# Patient Record
Sex: FEMALE | Race: WHITE | Employment: OTHER | URBAN - METROPOLITAN AREA
[De-identification: names, ages, dates, MRNs, and addresses within clinical notes are randomized per-mention and may not be internally consistent; named-entity substitution may affect disease eponyms.]

---

## 2017-01-01 ENCOUNTER — TELEPHONE (OUTPATIENT)
Dept: FAMILY MEDICINE | Facility: CLINIC | Age: 82
End: 2017-01-01

## 2017-01-01 ENCOUNTER — MEDICAL CORRESPONDENCE (OUTPATIENT)
Dept: HEALTH INFORMATION MANAGEMENT | Facility: CLINIC | Age: 82
End: 2017-01-01

## 2017-01-01 ENCOUNTER — TRANSFERRED RECORDS (OUTPATIENT)
Dept: HEALTH INFORMATION MANAGEMENT | Facility: CLINIC | Age: 82
End: 2017-01-01

## 2017-01-01 ENCOUNTER — ALLIED HEALTH/NURSE VISIT (OUTPATIENT)
Dept: FAMILY MEDICINE | Facility: CLINIC | Age: 82
End: 2017-01-01
Payer: MEDICARE

## 2017-01-01 ENCOUNTER — ALLIED HEALTH/NURSE VISIT (OUTPATIENT)
Dept: FAMILY MEDICINE | Facility: OTHER | Age: 82
End: 2017-01-01
Payer: MEDICARE

## 2017-01-01 ENCOUNTER — OFFICE VISIT (OUTPATIENT)
Dept: FAMILY MEDICINE | Facility: CLINIC | Age: 82
End: 2017-01-01
Payer: MEDICARE

## 2017-01-01 VITALS
BODY MASS INDEX: 21.48 KG/M2 | RESPIRATION RATE: 12 BRPM | HEART RATE: 92 BPM | WEIGHT: 113.7 LBS | SYSTOLIC BLOOD PRESSURE: 118 MMHG | DIASTOLIC BLOOD PRESSURE: 60 MMHG | TEMPERATURE: 99.2 F

## 2017-01-01 VITALS
WEIGHT: 115 LBS | BODY MASS INDEX: 21.73 KG/M2 | DIASTOLIC BLOOD PRESSURE: 70 MMHG | TEMPERATURE: 99 F | SYSTOLIC BLOOD PRESSURE: 150 MMHG | OXYGEN SATURATION: 97 % | HEART RATE: 83 BPM

## 2017-01-01 DIAGNOSIS — T83.511D URINARY TRACT INFECTION ASSOCIATED WITH INDWELLING URETHRAL CATHETER, SUBSEQUENT ENCOUNTER: Primary | ICD-10-CM

## 2017-01-01 DIAGNOSIS — E11.49 OTHER DIABETIC NEUROLOGICAL COMPLICATION ASSOCIATED WITH TYPE 2 DIABETES MELLITUS (H): ICD-10-CM

## 2017-01-01 DIAGNOSIS — N31.9 NEUROGENIC BLADDER: ICD-10-CM

## 2017-01-01 DIAGNOSIS — N39.0 URINARY TRACT INFECTION ASSOCIATED WITH INDWELLING URETHRAL CATHETER, INITIAL ENCOUNTER (H): Primary | ICD-10-CM

## 2017-01-01 DIAGNOSIS — E78.5 HYPERLIPIDEMIA LDL GOAL <100: ICD-10-CM

## 2017-01-01 DIAGNOSIS — I10 ESSENTIAL HYPERTENSION: ICD-10-CM

## 2017-01-01 DIAGNOSIS — J01.00 ACUTE NON-RECURRENT MAXILLARY SINUSITIS: ICD-10-CM

## 2017-01-01 DIAGNOSIS — E11.49 TYPE 2 DIABETES MELLITUS WITH OTHER NEUROLOGIC COMPLICATION, WITHOUT LONG-TERM CURRENT USE OF INSULIN (H): ICD-10-CM

## 2017-01-01 DIAGNOSIS — Z23 NEED FOR PROPHYLACTIC VACCINATION AND INOCULATION AGAINST INFLUENZA: Primary | ICD-10-CM

## 2017-01-01 DIAGNOSIS — S42.412D CLOSED DISPLACED SIMPLE SUPRACONDYLAR FRACTURE OF LEFT HUMERUS WITHOUT INTERCONDYLAR FRACTURE WITH ROUTINE HEALING, SUBSEQUENT ENCOUNTER: Primary | ICD-10-CM

## 2017-01-01 DIAGNOSIS — N39.0 URINARY TRACT INFECTION ASSOCIATED WITH INDWELLING URETHRAL CATHETER, SUBSEQUENT ENCOUNTER: Primary | ICD-10-CM

## 2017-01-01 DIAGNOSIS — R33.9 URINARY RETENTION WITH INCOMPLETE BLADDER EMPTYING: ICD-10-CM

## 2017-01-01 DIAGNOSIS — B35.1 ONYCHOMYCOSIS: ICD-10-CM

## 2017-01-01 DIAGNOSIS — T83.511A URINARY TRACT INFECTION ASSOCIATED WITH INDWELLING URETHRAL CATHETER, INITIAL ENCOUNTER (H): Primary | ICD-10-CM

## 2017-01-01 LAB
CREAT UR-MCNC: 97 MG/DL
EJECTION FRACTION: 65
HBA1C MFR BLD: 6.7 % (ref 4.3–6)
MICROALBUMIN UR-MCNC: 88 MG/L
MICROALBUMIN/CREAT UR: 90.39 MG/G CR (ref 0–25)

## 2017-01-01 PROCEDURE — 99214 OFFICE O/P EST MOD 30 MIN: CPT | Performed by: FAMILY MEDICINE

## 2017-01-01 PROCEDURE — 83036 HEMOGLOBIN GLYCOSYLATED A1C: CPT | Performed by: FAMILY MEDICINE

## 2017-01-01 PROCEDURE — 51702 INSERT TEMP BLADDER CATH: CPT

## 2017-01-01 PROCEDURE — 99211 OFF/OP EST MAY X REQ PHY/QHP: CPT | Mod: 25

## 2017-01-01 PROCEDURE — 36415 COLL VENOUS BLD VENIPUNCTURE: CPT | Performed by: FAMILY MEDICINE

## 2017-01-01 PROCEDURE — G0008 ADMIN INFLUENZA VIRUS VAC: HCPCS

## 2017-01-01 PROCEDURE — 99207 ZZC NO CHARGE NURSE ONLY: CPT

## 2017-01-01 PROCEDURE — 90662 IIV NO PRSV INCREASED AG IM: CPT

## 2017-01-01 PROCEDURE — 82043 UR ALBUMIN QUANTITATIVE: CPT | Performed by: FAMILY MEDICINE

## 2017-01-01 RX ORDER — CIPROFLOXACIN 500 MG/1
500 TABLET, FILM COATED ORAL 2 TIMES DAILY
Qty: 20 TABLET | Refills: 0 | Status: SHIPPED | OUTPATIENT
Start: 2017-01-01 | End: 2018-01-01

## 2017-01-01 RX ORDER — CIPROFLOXACIN 250 MG/1
TABLET, FILM COATED ORAL
COMMUNITY

## 2017-01-01 RX ORDER — ACETAMINOPHEN 325 MG/1
TABLET ORAL
COMMUNITY

## 2017-01-01 RX ORDER — QUETIAPINE FUMARATE 25 MG/1
TABLET, FILM COATED ORAL
COMMUNITY
Start: 2017-01-01

## 2017-01-01 RX ORDER — NITROFURANTOIN 25; 75 MG/1; MG/1
100 CAPSULE ORAL 2 TIMES DAILY
Qty: 20 CAPSULE | Refills: 0 | Status: SHIPPED | OUTPATIENT
Start: 2017-01-01 | End: 2017-01-01

## 2017-01-01 RX ORDER — QUETIAPINE FUMARATE 25 MG/1
TABLET, FILM COATED ORAL
COMMUNITY
End: 2017-01-01

## 2017-01-01 RX ORDER — OXYCODONE HYDROCHLORIDE 5 MG/1
TABLET ORAL
Refills: 0 | COMMUNITY
Start: 2017-06-29 | End: 2017-01-01

## 2017-01-01 ASSESSMENT — PAIN SCALES - GENERAL
PAINLEVEL: MODERATE PAIN (5)
PAINLEVEL: MILD PAIN (3)

## 2017-01-11 ENCOUNTER — ALLIED HEALTH/NURSE VISIT (OUTPATIENT)
Dept: FAMILY MEDICINE | Facility: CLINIC | Age: 82
End: 2017-01-11
Payer: MEDICARE

## 2017-01-11 DIAGNOSIS — N39.0 URINARY TRACT INFECTION ASSOCIATED WITH INDWELLING URETHRAL CATHETER, INITIAL ENCOUNTER (H): ICD-10-CM

## 2017-01-11 DIAGNOSIS — R41.82 ALTERED MENTAL STATUS, UNSPECIFIED ALTERED MENTAL STATUS TYPE: ICD-10-CM

## 2017-01-11 DIAGNOSIS — Z46.6 URINARY CATHETER (FOLEY) CHANGE REQUIRED: Primary | ICD-10-CM

## 2017-01-11 DIAGNOSIS — T83.511A URINARY TRACT INFECTION ASSOCIATED WITH INDWELLING URETHRAL CATHETER, INITIAL ENCOUNTER (H): ICD-10-CM

## 2017-01-11 LAB
ALBUMIN UR-MCNC: 100 MG/DL
APPEARANCE UR: ABNORMAL
BACTERIA #/AREA URNS HPF: ABNORMAL /HPF
BILIRUB UR QL STRIP: NEGATIVE
COLOR UR AUTO: YELLOW
GLUCOSE UR STRIP-MCNC: NEGATIVE MG/DL
HGB UR QL STRIP: ABNORMAL
KETONES UR STRIP-MCNC: NEGATIVE MG/DL
LEUKOCYTE ESTERASE UR QL STRIP: ABNORMAL
NITRATE UR QL: NEGATIVE
NON-SQ EPI CELLS #/AREA URNS LPF: ABNORMAL /LPF
PH UR STRIP: 7.5 PH (ref 5–7)
RBC #/AREA URNS AUTO: ABNORMAL /HPF (ref 0–2)
SP GR UR STRIP: 1.02 (ref 1–1.03)
URN SPEC COLLECT METH UR: ABNORMAL
UROBILINOGEN UR STRIP-ACNC: 0.2 EU/DL (ref 0.2–1)
WBC #/AREA URNS AUTO: ABNORMAL /HPF (ref 0–2)

## 2017-01-11 PROCEDURE — 81001 URINALYSIS AUTO W/SCOPE: CPT | Performed by: PHYSICIAN ASSISTANT

## 2017-01-11 PROCEDURE — 87186 SC STD MICRODIL/AGAR DIL: CPT | Performed by: PHYSICIAN ASSISTANT

## 2017-01-11 PROCEDURE — 99207 ZZC NO CHARGE NURSE ONLY: CPT

## 2017-01-11 PROCEDURE — 87088 URINE BACTERIA CULTURE: CPT | Performed by: PHYSICIAN ASSISTANT

## 2017-01-11 PROCEDURE — 87086 URINE CULTURE/COLONY COUNT: CPT | Performed by: PHYSICIAN ASSISTANT

## 2017-01-11 RX ORDER — CIPROFLOXACIN 500 MG/1
500 TABLET, FILM COATED ORAL 2 TIMES DAILY
Qty: 20 TABLET | Refills: 0 | Status: SHIPPED | OUTPATIENT
Start: 2017-01-11 | End: 2017-02-01

## 2017-01-11 NOTE — PROGRESS NOTES
Catheter insertion documentation on 1/11/2017:    María Hill presents to the clinic for catheter insertion.  Reason for insertion: scheduled insertion  Order has been verified. Dr. Adams  Catheter successfully inserted into the urethral meatus in the usual sterile fashion without immediate complication.  Type of catheter placed: 16 Australian indwelling catheter  Urine is cloudy and light yellow in color.  30 cc's of urine output returned.  Balloon was filled with 10 cc's of normal saline.  Securement device placed for the catheter.  The patient tolerated the procedure and was instructed to follow up with their PCP or consultant as planned, monitor for catheter dysfunction, monitor for pain or discomfort, return or call for pain, fever, leakage or decreased urine flow, watch for signs of infection and will call with UA results.    Alyce Kunz

## 2017-01-11 NOTE — PROGRESS NOTES
Left message on machine per ok of daughter Keith. Notified UA showed UTI, cipro sent to [facility] in [place]. Will need clinic appt with Dr. Adams for f/u once antibiotic complete.    Alyce Kunz, RN, BSN

## 2017-01-11 NOTE — PROGRESS NOTES
REviewed pts labs- last UA and UC. Last UC 12/7/2016- culture positive for e.coli, klebsiella, and pseudomonas, all susceptible to cipro.   Pt with normal sr creatinine and gfr 12/2016.  Will send cipro 500mg bid x10d to her pharmacy, urine to culture.  Follow up for OV if sxs not improving or worsening.   Kimmie Davies PA-C

## 2017-01-11 NOTE — PROGRESS NOTES
Catheter removal documentation on 1/11/2017:    María Hill presents to the clinic for catheter removal.  Reason for removal: replacement and scheduled insertion  Order has been verified. Orders per Dr. Adams  Catheter successfully removed at 9:29 AM without immediate complication.  500 cc's of urine present in the catheter bag.  Urethral meatus is free of secretions and encrustation.  The patient is afebrile.  The patient tolerated the procedure and was instructed to follow up with their PCP or consultant as planned, monitor for catheter dysfunction, monitor for pain or discomfort, return or call for pain, fever, leakage or decreased urine flow, watch for signs of infection and UA ordered, will call with results.     Alyce Kunz

## 2017-01-18 DIAGNOSIS — T83.511D URINARY TRACT INFECTION ASSOCIATED WITH INDWELLING URETHRAL CATHETER, SUBSEQUENT ENCOUNTER: Primary | ICD-10-CM

## 2017-01-18 DIAGNOSIS — N39.0 URINARY TRACT INFECTION ASSOCIATED WITH INDWELLING URETHRAL CATHETER, SUBSEQUENT ENCOUNTER: Primary | ICD-10-CM

## 2017-01-18 LAB
BACTERIA SPEC CULT: ABNORMAL
MICRO REPORT STATUS: ABNORMAL
MICROORGANISM SPEC CULT: ABNORMAL
MICROORGANISM SPEC CULT: ABNORMAL
SPECIMEN SOURCE: ABNORMAL

## 2017-01-18 RX ORDER — TETRACYCLINE HYDROCHLORIDE 500 MG/1
500 CAPSULE ORAL 2 TIMES DAILY
Qty: 20 CAPSULE | Refills: 0 | Status: SHIPPED | OUTPATIENT
Start: 2017-01-18 | End: 2017-02-01

## 2017-01-18 NOTE — PROGRESS NOTES
SUBJECTIVE:                                                    María Hill is a 89 year old female who presents to clinic today for the following health issues:      URINARY TRACT SYMPTOMS     Onset: 1month     Description:   Painful urination (Dysuria): no   Blood in urine (Hematuria): no   Delay in urine (Hesitency): no     Intensity: mild    Progression of Symptoms:  same    Accompanying Signs & Symptoms:  Fever/chills: yes  Flank pain no   Nausea and vomiting: no   Any vaginal symptoms: none  Abdominal/Pelvic Pain: no    History:   History of frequent UTI's: YES  History of kidney stones: no   Sexually Active: no   Possibility of pregnancy: No    Precipitating factors:   none         Therapies Tried and outcome: 2 rounds of anbtibiotics      In December, urine culture showed 3 different bacteria. Treated with cipro and the bacteria were all susceptible.   On recheck in January, she again had growth on culture. This time 2 different bacteria.   She has urine cath chronically  She has fatigue but no other symptoms.   No fever or vomiting. Appetite is unchanged.     Problem list and histories reviewed & adjusted, as indicated.  Additional history: as documented    Problem list, Medication list, Allergies, and Medical/Social/Surgical histories reviewed in [facility] and updated as appropriate.    ROS:  C: NEGATIVE for fever, chills, change in weight  E/M: NEGATIVE for ear, mouth and throat problems  R: NEGATIVE for significant cough or SOB  CV: NEGATIVE for chest pain, palpitations or peripheral edema  GI: NEGATIVE for nausea, abdominal pain, heartburn, or change in bowel habits   female: as above.     OBJECTIVE:                                                    /70 mmHg  Pulse 74  Temp(Src) 99.3  F (37.4  C) (Temporal)  Resp 16  Ht   Wt 114 lb 3.2 oz (51.801 kg)  Body mass index is 21.59 kg/(m^2).  GENERAL APPEARANCE: healthy, alert and no distress  HENT: throat is clear, Mucous membranes are  moist.   NECK: no adenopathy, no asymmetry, masses, or scars and thyroid normal to palpation  RESP: lungs clear to auscultation - no rales, rhonchi or wheezes  CV: regular rates and rhythm, normal S1 S2, no S3 or S4 and no murmur, click or rub  ABDOMEN: soft, nontender, without hepatosplenomegaly or masses and bowel sounds normal  MS: trace to 1+ edema bilateral lower extremities.     Diagnostic Test Results:  Results for orders placed or performed in visit on 02/01/17 (from the past 24 hour(s))   UA reflex to Microscopic and Culture   Result Value Ref Range    Color Urine Yellow     Appearance Urine Clear     Glucose Urine Negative NEG mg/dL    Bilirubin Urine Negative NEG    Ketones Urine Negative NEG mg/dL    Specific Gravity Urine 1.020 1.003 - 1.035    Blood Urine Moderate (A) NEG    pH Urine 7.0 5.0 - 7.0 pH    Protein Albumin Urine 100 (A) NEG mg/dL    Urobilinogen Urine 0.2 0.2 - 1.0 EU/dL    Nitrite Urine Negative NEG    Leukocyte Esterase Urine Large (A) NEG    Source Catheterized Urine    Urine Microscopic   Result Value Ref Range    WBC Urine  (A) 0 - 2 /HPF    RBC Urine 25-50 (A) 0 - 2 /HPF    Renal Tub Epi Moderate (A) NEG /HPF    Bacteria Urine Moderate (A) NEG /HPF    Yeast Urine Moderate (A) NEG /HPF        ASSESSMENT/PLAN:                                                        (T83.511D,  N39.0) Urinary tract infection associated with indwelling urethral catheter, subsequent encounter  (primary encounter diagnosis)  (N31.9) Neurogenic bladder  Comment: patient with chronic catheter due to neurogenic bladder.   Reviewed results with her.   Will place back on cipro.   Awaiting culture results.   Has has different bacteria with the two cultures obtained December and January.   Will adjust medication as needed.   Plan: ciprofloxacin (CIPRO) 500 MG tablet        Patient and her daughter agree to plan.       (R60.0) Bilateral edema of lower extremity  Comment: mild.   Plan: discussed elevation of  legs. Use SIDDHARTH hose.   Recheck if fails to improve or if worsening in any way.     (R30.0) Dysuria  Comment: see above.   Plan: UA reflex to Microscopic and Culture, Urine         Microscopic            (R82.90) Nonspecific finding on examination of urine  Comment: as above.   Plan: Urine Culture Aerobic Bacterial              MAURICE WOODS MD, MD  [facility]

## 2017-01-18 NOTE — TELEPHONE ENCOUNTER
Miladis called back. [facility]/ [facility] [place].   Thank you,  Rocio Carmona- Pt Rep.

## 2017-01-18 NOTE — TELEPHONE ENCOUNTER
Left message to call back. CTC on file with daughter Miladis. Will still need f/u on 1/24/17 as planned. Need to triage and determine if symptoms are worsening/improving/same.     Alyce Kunz, RN, BSN

## 2017-01-18 NOTE — TELEPHONE ENCOUNTER
Please call pt- was in for catheter change last week, concerns for UTI. Urine culture showed mixed bacteria. Cultured showing staph bacteria not sensitive to the cipro has been taking. Culture sensitive to tetracycline-- can send in 500mg BID x 10 d. Which pharmacy would she like this to go to. Is she still having sxs? Worsening or stable?  Kimmie Davies PA-C

## 2017-02-01 ENCOUNTER — OFFICE VISIT (OUTPATIENT)
Dept: FAMILY MEDICINE | Facility: CLINIC | Age: 82
End: 2017-02-01
Payer: MEDICARE

## 2017-02-01 VITALS
WEIGHT: 114.2 LBS | SYSTOLIC BLOOD PRESSURE: 120 MMHG | DIASTOLIC BLOOD PRESSURE: 70 MMHG | RESPIRATION RATE: 16 BRPM | HEART RATE: 74 BPM | BODY MASS INDEX: 21.59 KG/M2 | TEMPERATURE: 99.3 F

## 2017-02-01 DIAGNOSIS — R82.90 NONSPECIFIC FINDING ON EXAMINATION OF URINE: ICD-10-CM

## 2017-02-01 DIAGNOSIS — T83.511D URINARY TRACT INFECTION ASSOCIATED WITH INDWELLING URETHRAL CATHETER, SUBSEQUENT ENCOUNTER: Primary | ICD-10-CM

## 2017-02-01 DIAGNOSIS — R60.0 BILATERAL EDEMA OF LOWER EXTREMITY: ICD-10-CM

## 2017-02-01 DIAGNOSIS — R30.0 DYSURIA: ICD-10-CM

## 2017-02-01 DIAGNOSIS — N39.0 URINARY TRACT INFECTION ASSOCIATED WITH INDWELLING URETHRAL CATHETER, SUBSEQUENT ENCOUNTER: Primary | ICD-10-CM

## 2017-02-01 DIAGNOSIS — N31.9 NEUROGENIC BLADDER: ICD-10-CM

## 2017-02-01 LAB
ALBUMIN UR-MCNC: 100 MG/DL
APPEARANCE UR: CLEAR
BACTERIA #/AREA URNS HPF: ABNORMAL /HPF
BILIRUB UR QL STRIP: NEGATIVE
COLOR UR AUTO: YELLOW
GLUCOSE UR STRIP-MCNC: NEGATIVE MG/DL
HGB UR QL STRIP: ABNORMAL
KETONES UR STRIP-MCNC: NEGATIVE MG/DL
LEUKOCYTE ESTERASE UR QL STRIP: ABNORMAL
NITRATE UR QL: NEGATIVE
PH UR STRIP: 7 PH (ref 5–7)
RBC #/AREA URNS AUTO: ABNORMAL /HPF (ref 0–2)
RENAL EPI CELLS #/AREA URNS HPF: ABNORMAL /HPF
SP GR UR STRIP: 1.02 (ref 1–1.03)
URN SPEC COLLECT METH UR: ABNORMAL
UROBILINOGEN UR STRIP-ACNC: 0.2 EU/DL (ref 0.2–1)
WBC #/AREA URNS AUTO: ABNORMAL /HPF (ref 0–2)
YEAST #/AREA URNS HPF: ABNORMAL /HPF

## 2017-02-01 PROCEDURE — 87106 FUNGI IDENTIFICATION YEAST: CPT | Performed by: FAMILY MEDICINE

## 2017-02-01 PROCEDURE — 87086 URINE CULTURE/COLONY COUNT: CPT | Performed by: FAMILY MEDICINE

## 2017-02-01 PROCEDURE — 81001 URINALYSIS AUTO W/SCOPE: CPT | Performed by: FAMILY MEDICINE

## 2017-02-01 PROCEDURE — 99214 OFFICE O/P EST MOD 30 MIN: CPT | Performed by: FAMILY MEDICINE

## 2017-02-01 RX ORDER — CIPROFLOXACIN 500 MG/1
500 TABLET, FILM COATED ORAL 2 TIMES DAILY
Qty: 20 TABLET | Refills: 0 | Status: SHIPPED | OUTPATIENT
Start: 2017-02-01 | End: 2017-04-19

## 2017-02-01 NOTE — MR AVS SNAPSHOT
After Visit Summary   2/1/2017    María Hill    MRN: [id number]           Patient Information     Date Of Birth          2/22/1926        Visit Information        Provider Department      2/1/2017 11:40 AM Jessy Adams MD [facility] [place]        Today's Diagnoses     Urinary tract infection associated with indwelling urethral catheter, subsequent encounter    -  1     Bilateral edema of lower extremity         Dysuria         Nonspecific finding on examination of urine           Care Instructions      [facility] [phone]          Follow-ups after your visit        Who to contact     If you have questions or need follow up information about today's clinic visit or your schedule please contact [facility] [place] directly at [phone].  Normal or non-critical lab and imaging results will be communicated to you by MyChart, letter or phone within 4 business days after the clinic has received the results. If you do not hear from us within 7 days, please contact the clinic through [email or web address] or phone. If you have a critical or abnormal lab result, we will notify you by phone as soon as possible.  Submit refill requests through [employer] or call your pharmacy and they will forward the refill request to us. Please allow 3 business days for your refill to be completed.          Additional Information About Your Visit        MyChart Information     [employer] gives you secure access to your electronic health record. If you see a primary care provider, you can also send messages to your care team and make appointments. If you have questions, please call your primary care clinic.  If you do not have a primary care provider, please call [phone] and they will assist you.        Care EveryWhere ID     This is your Care EveryWhere ID. This could be used by other organizations to access your [facility] medical records  [id number]        Your Vitals Were     Pulse Temperature  Respirations             74 99.3  F (37.4  C) (Temporal) 16          Blood Pressure from Last 3 Encounters:   02/01/17 120/70   12/07/16 122/80   09/22/16 134/80    Weight from Last 3 Encounters:   02/01/17 114 lb 3.2 oz (51.801 kg)   12/07/16 115 lb 3.2 oz (52.254 kg)   09/22/16 112 lb 11.2 oz (51.12 kg)              We Performed the Following     UA reflex to Microscopic and Culture     Urine Culture Aerobic Bacterial     Urine Microscopic          Today's Medication Changes          These changes are accurate as of: 2/1/17 12:28 PM.  If you have any questions, ask your nurse or doctor.               Start taking these medicines.        Dose/Directions    ciprofloxacin 500 MG tablet   Commonly known as:  CIPRO   Used for:  Urinary tract infection associated with indwelling urethral catheter, subsequent encounter   Started by:  Jessy Adams MD        Dose:  500 mg   Take 1 tablet (500 mg) by mouth 2 times daily   Quantity:  20 tablet   Refills:  0            Where to get your medicines      These medications were sent to [place] [address] [place] [address] [place] [id number]     Phone:  [phone]    - ciprofloxacin 500 MG tablet             Primary Care Provider Office Phone # Fax #    Jessy Adams [phone]       [facility] [address] [facility] [address]        Thank you!     Thank you for choosing [facility]  for your care. Our goal is always to provide you with excellent care. Hearing back from our patients is one way we can continue to improve our services. Please take a few minutes to complete the written survey that you may receive in the mail after your visit with us. Thank you!             Your Updated Medication List - Protect others around you: Learn how to safely use, store and throw away your medicines at www.disposemymeds.org.          This list is accurate as of: 2/1/17 12:28 PM.  Always use your most  recent med list.                   Brand Name Dispense Instructions for use    aspirin 81 MG tablet      Take 81 mg by mouth daily       blood glucose monitoring lancets     100 Box    1 each by In Vitro route 3 times daily Use to test blood sugar 3 times daily or as directed.       blood glucose monitoring meter device kit     1 kit    USE TO CHECK GLUCOSE THREE TIMES DAILY OR AS DIRECTED       blood glucose monitoring test strip    ONE TOUCH ULTRA    100 each    TEST 3 TIMES DAILY OR AS DIRECTED       ciprofloxacin 500 MG tablet    CIPRO    20 tablet    Take 1 tablet (500 mg) by mouth 2 times daily       COQ-10 PO          CRANBERRY CONCENTRATE 500 MG Caps   Generic drug:  Cranberry     60 capsule    TAKE 1 CAPSULE BY MOUTH DAILY       GLUCOSAMINE CHONDR COMPLEX PO          metFORMIN 500 MG 24 hr tablet    GLUCOPHAGE-XR    360 tablet    TAKE 2 TABLETS BY MOUTH 2 TIMES DAILY WITH MEALS       OCUVITE PRESERVISION Tabs          * order for DME     2 each    Equipment being ordered: catheter secure strap, please fill the elastic strap with the green velcroe closure.       * order for DME     1 Units    Equipment being ordered: Wheelchair       order for DME     3 each    Equipment being ordered: supplies for catheterization. Leg bag replacements and attachments. Dispense per insurance allowance.       ramipril 10 MG capsule    ALTACE    90 capsule    TAKE 1 CAPSULE BY MOUTH DAILY       T.E.D. BELOW KNEE/M-REGULAR Misc     1 Package    Wear stockings daily       * Notice:  This list has 2 medication(s) that are the same as other medications prescribed for you. Read the directions carefully, and ask your doctor or other care provider to review them with you.

## 2017-02-01 NOTE — NURSING NOTE
"Chief Complaint   Patient presents with     RECHECK     UTI     Panel Management     Statin, Eye exam       Initial /70 mmHg  Pulse 74  Temp(Src) 99.3  F (37.4  C) (Temporal)  Resp 16  Ht   Wt 114 lb 3.2 oz (51.801 kg) Estimated body mass index is 21.59 kg/(m^2) as calculated from the following:    Height as of 12/7/16: 5' 1\" (1.549 m).    Weight as of this encounter: 114 lb 3.2 oz (51.801 kg).  BP completed using cuff size: regular  SMA Rochelle    "

## 2017-02-03 LAB
BACTERIA SPEC CULT: ABNORMAL
MICRO REPORT STATUS: ABNORMAL
SPECIMEN SOURCE: ABNORMAL

## 2017-02-06 ENCOUNTER — TELEPHONE (OUTPATIENT)
Dept: FAMILY MEDICINE | Facility: CLINIC | Age: 82
End: 2017-02-06

## 2017-02-06 NOTE — TELEPHONE ENCOUNTER
Please notify patient's daughter Miladis of results.     Patient's culture grew yeast (candida).  This is likely from the antibiotics that she has been on.  It is unlikely that this needs to be treated as she doesn't have symptoms of kidney infection - no fever, back pain or abdominal pain.     If those things develop, then would like to see her in the office.     Please stop the Cipro - it is not needed.     The catheter being changed last week will likely help the yeast resolve.     If any concerns, please let me know.

## 2017-02-10 DIAGNOSIS — E11.65 TYPE 2 DIABETES MELLITUS WITH HYPERGLYCEMIA (H): Primary | ICD-10-CM

## 2017-02-10 NOTE — TELEPHONE ENCOUNTER
Metformin 500 MG         Last Written Prescription Date: 08/05/2016  Last Fill Quantity: 360, # refills: 1  Last Office Visit with [facility], [facility]MP or  Health prescribing provider:  12/1/2017   Next 5 appointments (look out 90 days)     Mar 01, 2017 10:00 AM   Nurse Only with RG RN   [facility] [place] ([facility]    [address], [address]  [place] [id number] [phone]                   BP Readings from Last 3 Encounters:   02/01/17 120/70   12/07/16 122/80   09/22/16 134/80     MICROL      111   6/9/2016  No results found for this basename: microalbumin  CREATININE   Date Value Ref Range Status   12/07/2016 0.65 0.52 - 1.04 mg/dL Final   ]  GFR ESTIMATE   Date Value Ref Range Status   12/07/2016 86 >60 mL/min/1.7m2 Final     Comment:     Non  GFR Calc   05/28/2016 >90  Non  GFR Calc   >60 mL/min/1.7m2 Final   05/24/2016 88 >60 mL/min/1.7m2 Final     Comment:     Non  GFR Calc     GFR ESTIMATE IF BLACK   Date Value Ref Range Status   12/07/2016 >90   GFR Calc   >60 mL/min/1.7m2 Final   05/28/2016 >90   GFR Calc   >60 mL/min/1.7m2 Final   05/24/2016 >90   GFR Calc   >60 mL/min/1.7m2 Final     CHOL      219   12/7/2016  HDL       64   12/7/2016  LDL      133   12/7/2016  TRIG      108   12/7/2016  CHOLHDLRATIO      2.6   5/6/2015  AST       13   12/7/2016  ALT       25   12/7/2016  A1C      7.0   12/7/2016  A1C      7.0   6/9/2016  A1C      7.8   12/9/2015  A1C      7.3   8/13/2015  A1C      7.3   5/6/2015  POTASSIUM   Date Value Ref Range Status   12/07/2016 3.8 3.4 - 5.3 mmol/L Final

## 2017-02-14 RX ORDER — METFORMIN HCL 500 MG
TABLET, EXTENDED RELEASE 24 HR ORAL
Qty: 360 TABLET | Refills: 0 | Status: SHIPPED | OUTPATIENT
Start: 2017-02-14 | End: 2017-05-11

## 2017-02-14 NOTE — TELEPHONE ENCOUNTER
Prescription approved per [facility] Refill Protocol.  Due 6/2017 for recheck of diabetes and other HM. Labs will be due also.  Alyce Kunz, RN, BSN

## 2017-03-01 ENCOUNTER — ALLIED HEALTH/NURSE VISIT (OUTPATIENT)
Dept: FAMILY MEDICINE | Facility: CLINIC | Age: 82
End: 2017-03-01
Payer: MEDICARE

## 2017-03-01 DIAGNOSIS — Z46.6 URINARY CATHETER (FOLEY) CHANGE REQUIRED: Primary | ICD-10-CM

## 2017-03-01 PROCEDURE — 99207 ZZC NO CHARGE NURSE ONLY: CPT

## 2017-03-01 NOTE — PROGRESS NOTES
Catheter insertion documentation on 3/1/2017:    María Hill presents to the clinic for catheter insertion.  Reason for insertion: scheduled insertion  Order has been verified. SF orders  Catheter successfully inserted into the urethral meatus in the usual sterile fashion without immediate complication.  Type of catheter placed: 16 Uzbek indwelling catheter  Urine is yellow in color.  20 cc's of urine output returned.  Balloon was filled with 10 cc's of normal saline.  Securement device placed for the catheter.  The patient tolerated the procedure and was instructed to follow up with their PCP or consultant as planned, monitor for catheter dysfunction, monitor for pain or discomfort, return or call for pain, fever, leakage or decreased urine flow and watch for signs of infection    Alyce Kunz

## 2017-03-01 NOTE — MR AVS SNAPSHOT
After Visit Summary   3/1/2017    María Hill    MRN: [id number]           Patient Information     Date Of Birth          2/22/1926        Visit Information        Provider Department      3/1/2017 10:00 AM [facility] [place]        Today's Diagnoses     Urinary catheter (Schultz) change required    -  1       Follow-ups after your visit        Follow-up notes from your care team     Return in about 1 month (around 4/1/2017).      Your next 10 appointments already scheduled     Apr 03, 2017 10:00 AM CDT   Nurse Only with RG RN   [facility] [place] ([facility] [place])    [address], [address]  [place] [id number] [phone]              Who to contact     If you have questions or need follow up information about today's clinic visit or your schedule please contact [facility] [place] directly at [phone].  Normal or non-critical lab and imaging results will be communicated to you by MyChart, letter or phone within 4 business days after the clinic has received the results. If you do not hear from us within 7 days, please contact the clinic through [employer] or phone. If you have a critical or abnormal lab result, we will notify you by phone as soon as possible.  Submit refill requests through [employer] or call your pharmacy and they will forward the refill request to us. Please allow 3 business days for your refill to be completed.          Additional Information About Your Visit        MyChart Information     [employer] gives you secure access to your electronic health record. If you see a primary care provider, you can also send messages to your care team and make appointments. If you have questions, please call your primary care clinic.  If you do not have a primary care provider, please call [phone] and they will assist you.        Care EveryWhere ID     This is your Care EveryWhere ID. This could be used by other organizations to access your  [facility] medical records  [id number]         Blood Pressure from Last 3 Encounters:   02/01/17 120/70   12/07/16 122/80   09/22/16 134/80    Weight from Last 3 Encounters:   02/01/17 114 lb 3.2 oz (51.8 kg)   12/07/16 115 lb 3.2 oz (52.3 kg)   09/22/16 112 lb 11.2 oz (51.1 kg)              Today, you had the following     No orders found for display       Primary Care Provider Office Phone # Fax #    Jessy Adams [phone]       [facility] [address] [facility] [address]        Thank you!     Thank you for choosing [facility]  for your care. Our goal is always to provide you with excellent care. Hearing back from our patients is one way we can continue to improve our services. Please take a few minutes to complete the written survey that you may receive in the mail after your visit with us. Thank you!             Your Updated Medication List - Protect others around you: Learn how to safely use, store and throw away your medicines at www.disposemymeds.org.          This list is accurate as of: 3/1/17 10:56 AM.  Always use your most recent med list.                   Brand Name Dispense Instructions for use    aspirin 81 MG tablet      Take 81 mg by mouth daily       blood glucose monitoring lancets     100 Box    1 each by In Vitro route 3 times daily Use to test blood sugar 3 times daily or as directed.       blood glucose monitoring meter device kit     1 kit    USE TO CHECK GLUCOSE THREE TIMES DAILY OR AS DIRECTED       blood glucose monitoring test strip    ONE TOUCH ULTRA    100 each    TEST 3 TIMES DAILY OR AS DIRECTED       ciprofloxacin 500 MG tablet    CIPRO    20 tablet    Take 1 tablet (500 mg) by mouth 2 times daily       COQ-10 PO          CRANBERRY CONCENTRATE 500 MG Caps   Generic drug:  Cranberry     60 capsule    TAKE 1 CAPSULE BY MOUTH DAILY       GLUCOSAMINE CHONDR COMPLEX PO          metFORMIN 500 MG 24 hr tablet    GLUCOPHAGE-XR    360 tablet     TAKE 2 TABLETS BY MOUTH 2 TIMES DAILY WITH MEALS       OCUVITE PRESERVISION Tabs          * order for DME     2 each    Equipment being ordered: catheter secure strap, please fill the elastic strap with the green velcroe closure.       * order for DME     1 Units    Equipment being ordered: Wheelchair       order for DME     3 each    Equipment being ordered: supplies for catheterization. Leg bag replacements and attachments. Dispense per insurance allowance.       ramipril 10 MG capsule    ALTACE    90 capsule    TAKE 1 CAPSULE BY MOUTH DAILY       T.E.D. BELOW KNEE/M-REGULAR Misc     1 Package    Wear stockings daily       * Notice:  This list has 2 medication(s) that are the same as other medications prescribed for you. Read the directions carefully, and ask your doctor or other care provider to review them with you.

## 2017-03-01 NOTE — PROGRESS NOTES
Catheter removal documentation on 3/1/2017:    María Hill presents to the clinic for catheter removal.  Reason for removal: replacement  Order has been verified. SF orders  Catheter successfully removed at 10:53 AM without immediate complication.  200 cc's of urine present in the catheter bag.  Urethral meatus is free of secretions and encrustation.  The patient is afebrile.  The patient tolerated the procedure and was instructed to follow up with their PCP or consultant as planned, monitor for catheter dysfunction, monitor for pain or discomfort, return or call for pain, fever, leakage or decreased urine flow, watch for signs of infection and schedule with RN in 1 month for replacement.    Alyce Kunz

## 2017-03-16 ENCOUNTER — TRANSFERRED RECORDS (OUTPATIENT)
Dept: HEALTH INFORMATION MANAGEMENT | Facility: CLINIC | Age: 82
End: 2017-03-16

## 2017-04-03 ENCOUNTER — ALLIED HEALTH/NURSE VISIT (OUTPATIENT)
Dept: FAMILY MEDICINE | Facility: CLINIC | Age: 82
End: 2017-04-03
Payer: MEDICARE

## 2017-04-03 ENCOUNTER — TELEPHONE (OUTPATIENT)
Dept: FAMILY MEDICINE | Facility: CLINIC | Age: 82
End: 2017-04-03

## 2017-04-03 DIAGNOSIS — Z46.6 URINARY CATHETER (FOLEY) CHANGE REQUIRED: Primary | ICD-10-CM

## 2017-04-03 LAB
ALBUMIN UR-MCNC: ABNORMAL MG/DL
AMORPH CRY #/AREA URNS HPF: ABNORMAL /HPF
APPEARANCE UR: ABNORMAL
BACTERIA #/AREA URNS HPF: ABNORMAL /HPF
BILIRUB UR QL STRIP: NEGATIVE
COLOR UR AUTO: YELLOW
GLUCOSE UR STRIP-MCNC: NEGATIVE MG/DL
HGB UR QL STRIP: ABNORMAL
KETONES UR STRIP-MCNC: NEGATIVE MG/DL
LEUKOCYTE ESTERASE UR QL STRIP: ABNORMAL
NITRATE UR QL: NEGATIVE
NON-SQ EPI CELLS #/AREA URNS LPF: ABNORMAL /LPF
PH UR STRIP: 5 PH (ref 5–7)
RBC #/AREA URNS AUTO: ABNORMAL /HPF (ref 0–2)
SP GR UR STRIP: 1.03 (ref 1–1.03)
URN SPEC COLLECT METH UR: ABNORMAL
UROBILINOGEN UR STRIP-ACNC: 0.2 EU/DL (ref 0.2–1)
WBC #/AREA URNS AUTO: ABNORMAL /HPF (ref 0–2)

## 2017-04-03 PROCEDURE — 81001 URINALYSIS AUTO W/SCOPE: CPT | Performed by: FAMILY MEDICINE

## 2017-04-03 PROCEDURE — 87186 SC STD MICRODIL/AGAR DIL: CPT | Performed by: FAMILY MEDICINE

## 2017-04-03 PROCEDURE — 87086 URINE CULTURE/COLONY COUNT: CPT | Performed by: FAMILY MEDICINE

## 2017-04-03 PROCEDURE — 87088 URINE BACTERIA CULTURE: CPT | Performed by: FAMILY MEDICINE

## 2017-04-03 PROCEDURE — 99207 ZZC NO CHARGE NURSE ONLY: CPT

## 2017-04-03 NOTE — TELEPHONE ENCOUNTER
Called Miladis - daughter. No answer. Left message.     UA shows some white blood cells. Reviewed results in Care Everywhere. Last urine culture appears to be contaminants.  Has chronic catheter. Changed today.     Recommend awaiting culture results. If having increase in confusion, fever, vomiting, increased weakness or other concerns, consider antibiotic until culture results are back.     To return call with any questions or concerns.

## 2017-04-03 NOTE — PROGRESS NOTES
UA placed per verbal order of Dr. Adams. Patient had been recently hospitalized for UTI. Has not had UA since stopping antibiotic therapy. Patient to f/u in clinic with provider.      Catheter removal documentation on 4/3/2017:    María Hill presents to the clinic for catheter removal.  Reason for removal: replacement  Order has been verified. Per Dr. Adams standing order.   Catheter successfully removed at 11:07 AM without immediate complication.  100 cc's of urine present in the catheter bag.  Urethral meatus is free of secretions and encrustation.  The patient is afebrile.  The patient tolerated the procedure and was instructed to follow up with their PCP or consultant as planned, monitor for catheter dysfunction, monitor for pain or discomfort, return or call for pain, fever, leakage or decreased urine flow and watch for signs of infection    Alyce Kunz        Catheter insertion documentation on 4/3/2017:    María Hill presents to the clinic for catheter insertion.  Reason for insertion: scheduled insertion  Order has been verified. Dr. Adams  Catheter successfully inserted into the urethral meatus in the usual sterile fashion without immediate complication.  Type of catheter placed: 16 [place] citizen indwelling catheter  Urine is yellow/cloudy in color.  25 cc's of urine output returned.  Balloon was filled with 10 cc's of normal saline.  Securement device placed for the catheter.  The patient tolerated the procedure and was instructed to follow up with their PCP or consultant as planned, monitor for catheter dysfunction, monitor for pain or discomfort, return or call for pain, fever, leakage or decreased urine flow and watch for signs of infection    Alyce Kunz

## 2017-04-03 NOTE — MR AVS SNAPSHOT
After Visit Summary   4/3/2017    María Hill    MRN: [id number]           Patient Information     Date Of Birth          2/22/1926        Visit Information        Provider Department      4/3/2017 10:00 AM RG RN [facility] [place]        Today's Diagnoses     Urinary catheter (Schultz) change required    -  1       Follow-ups after your visit        Your next 10 appointments already scheduled     May 03, 2017 11:00 AM CDT   Nurse Only with RG RN   [facility] [place] ([facility])    [address], [address]  [place] [address] [phone]              Who to contact     If you have questions or need follow up information about today's clinic visit or your schedule please contact [facility] directly at [phone].  Normal or non-critical lab and imaging results will be communicated to you by RPosthart, letter or phone within 4 business days after the clinic has received the results. If you do not hear from us within 7 days, please contact the clinic through RPosthart or phone. If you have a critical or abnormal lab result, we will notify you by phone as soon as possible.  Submit refill requests through Edvert or call your pharmacy and they will forward the refill request to us. Please allow 3 business days for your refill to be completed.          Additional Information About Your Visit        MyChart Information     Edvert gives you secure access to your electronic health record. If you see a primary care provider, you can also send messages to your care team and make appointments. If you have questions, please call your primary care clinic.  If you do not have a primary care provider, please call [phone] and they will assist you.        Care EveryWhere ID     This is your Care EveryWhere ID. This could be used by other organizations to access your [facility] medical records  [id number]         Blood Pressure from Last 3 Encounters:    02/01/17 120/70   12/07/16 122/80   09/22/16 134/80    Weight from Last 3 Encounters:   02/01/17 114 lb 3.2 oz (51.8 kg)   12/07/16 115 lb 3.2 oz (52.3 kg)   09/22/16 112 lb 11.2 oz (51.1 kg)              We Performed the Following     UA reflex to Microscopic        Primary Care Provider Office Phone # Fax #    Jessy Adams [phone]       [facility] [address] [address] [address]        Thank you!     Thank you for choosing [facility]  for your care. Our goal is always to provide you with excellent care. Hearing back from our patients is one way we can continue to improve our services. Please take a few minutes to complete the written survey that you may receive in the mail after your visit with us. Thank you!             Your Updated Medication List - Protect others around you: Learn how to safely use, store and throw away your medicines at www.disposemymeds.org.          This list is accurate as of: 4/3/17 11:12 AM.  Always use your most recent med list.                   Brand Name Dispense Instructions for use    aspirin 81 MG tablet      Take 81 mg by mouth daily       blood glucose monitoring lancets     100 Box    1 each by In Vitro route 3 times daily Use to test blood sugar 3 times daily or as directed.       blood glucose monitoring meter device kit     1 kit    USE TO CHECK GLUCOSE THREE TIMES DAILY OR AS DIRECTED       blood glucose monitoring test strip    ONE TOUCH ULTRA    100 each    TEST 3 TIMES DAILY OR AS DIRECTED       ciprofloxacin 500 MG tablet    CIPRO    20 tablet    Take 1 tablet (500 mg) by mouth 2 times daily       COQ-10 PO          CRANBERRY CONCENTRATE 500 MG Caps   Generic drug:  Cranberry     60 capsule    TAKE 1 CAPSULE BY MOUTH DAILY       GLUCOSAMINE CHONDR COMPLEX PO          metFORMIN 500 MG 24 hr tablet    GLUCOPHAGE-XR    360 tablet    TAKE 2 TABLETS BY MOUTH 2 TIMES DAILY WITH MEALS       OCUVITE PRESERVISION Tabs           * order for DME     2 each    Equipment being ordered: catheter secure strap, please fill the elastic strap with the green velcroe closure.       * order for DME     1 Units    Equipment being ordered: Wheelchair       order for DME     3 each    Equipment being ordered: supplies for catheterization. Leg bag replacements and attachments. Dispense per insurance allowance.       ramipril 10 MG capsule    ALTACE    90 capsule    TAKE 1 CAPSULE BY MOUTH DAILY       T.E.D. BELOW KNEE/M-REGULAR Misc     1 Package    Wear stockings daily       * Notice:  This list has 2 medication(s) that are the same as other medications prescribed for you. Read the directions carefully, and ask your doctor or other care provider to review them with you.

## 2017-04-06 ENCOUNTER — TELEPHONE (OUTPATIENT)
Dept: FAMILY MEDICINE | Facility: CLINIC | Age: 82
End: 2017-04-06

## 2017-04-06 DIAGNOSIS — T83.511A URINARY TRACT INFECTION ASSOCIATED WITH INDWELLING URETHRAL CATHETER, INITIAL ENCOUNTER (H): Primary | ICD-10-CM

## 2017-04-06 DIAGNOSIS — N39.0 URINARY TRACT INFECTION ASSOCIATED WITH INDWELLING URETHRAL CATHETER, INITIAL ENCOUNTER (H): Primary | ICD-10-CM

## 2017-04-06 LAB
BACTERIA SPEC CULT: ABNORMAL
MICRO REPORT STATUS: ABNORMAL
MICROORGANISM SPEC CULT: ABNORMAL
SPECIMEN SOURCE: ABNORMAL

## 2017-04-06 RX ORDER — CIPROFLOXACIN 500 MG/1
500 TABLET, FILM COATED ORAL 2 TIMES DAILY
Qty: 20 TABLET | Refills: 0 | Status: SHIPPED | OUTPATIENT
Start: 2017-04-06 | End: 2017-04-19

## 2017-04-06 NOTE — TELEPHONE ENCOUNTER
Spoke with Miladis.  Reviewed results of the urine culture.     Placed on cipro for 10 days.     Follow up visit when done or shortly thereafter.     All questions invited, asked and answered to apparent satisfaction.  Agrees to plan.

## 2017-04-10 ENCOUNTER — TELEPHONE (OUTPATIENT)
Dept: FAMILY MEDICINE | Facility: CLINIC | Age: 82
End: 2017-04-10

## 2017-04-10 DIAGNOSIS — T83.511D URINARY TRACT INFECTION ASSOCIATED WITH INDWELLING URETHRAL CATHETER, SUBSEQUENT ENCOUNTER: Primary | ICD-10-CM

## 2017-04-10 DIAGNOSIS — N39.0 URINARY TRACT INFECTION ASSOCIATED WITH INDWELLING URETHRAL CATHETER, SUBSEQUENT ENCOUNTER: Primary | ICD-10-CM

## 2017-04-10 RX ORDER — NITROFURANTOIN 25; 75 MG/1; MG/1
100 CAPSULE ORAL 2 TIMES DAILY
Qty: 14 CAPSULE | Refills: 0 | Status: SHIPPED | OUTPATIENT
Start: 2017-04-10 | End: 2017-04-19

## 2017-04-10 NOTE — TELEPHONE ENCOUNTER
Patient is doing better. Has some improvement with strength though still not herself per daughter.     Urine culture has 3rd bacteria in smaller amount.     Add nitrofurantoin to regimen for one week.     Recheck in the office next week. Sooner for concerns.     All questions invited, asked and answered to the patient's apparent satisfaction.  Patient agrees to plan.

## 2017-04-19 ENCOUNTER — OFFICE VISIT (OUTPATIENT)
Dept: FAMILY MEDICINE | Facility: CLINIC | Age: 82
End: 2017-04-19
Payer: MEDICARE

## 2017-04-19 ENCOUNTER — TELEPHONE (OUTPATIENT)
Dept: FAMILY MEDICINE | Facility: CLINIC | Age: 82
End: 2017-04-19

## 2017-04-19 VITALS
TEMPERATURE: 99.1 F | DIASTOLIC BLOOD PRESSURE: 70 MMHG | RESPIRATION RATE: 12 BRPM | HEIGHT: 61 IN | WEIGHT: 117.1 LBS | SYSTOLIC BLOOD PRESSURE: 108 MMHG | HEART RATE: 76 BPM | BODY MASS INDEX: 22.11 KG/M2
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
## 2017-04-19 DIAGNOSIS — T83.511D URINARY TRACT INFECTION ASSOCIATED WITH INDWELLING URETHRAL CATHETER, SUBSEQUENT ENCOUNTER: Primary | ICD-10-CM

## 2017-04-19 DIAGNOSIS — E11.9 TYPE 2 DIABETES MELLITUS WITHOUT COMPLICATION, WITHOUT LONG-TERM CURRENT USE OF INSULIN (H): ICD-10-CM

## 2017-04-19 DIAGNOSIS — M62.81 GENERALIZED MUSCLE WEAKNESS: ICD-10-CM

## 2017-04-19 DIAGNOSIS — N39.0 URINARY TRACT INFECTION ASSOCIATED WITH INDWELLING URETHRAL CATHETER, SUBSEQUENT ENCOUNTER: Primary | ICD-10-CM

## 2017-04-19 LAB
ALBUMIN UR-MCNC: 100 MG/DL
APPEARANCE UR: ABNORMAL
BILIRUB UR QL STRIP: NEGATIVE
COLOR UR AUTO: YELLOW
GLUCOSE UR STRIP-MCNC: NEGATIVE MG/DL
HGB UR QL STRIP: ABNORMAL
KETONES UR STRIP-MCNC: ABNORMAL MG/DL
LEUKOCYTE ESTERASE UR QL STRIP: ABNORMAL
NITRATE UR QL: NEGATIVE
PH UR STRIP: 5 PH (ref 5–7)
RBC #/AREA URNS AUTO: ABNORMAL /HPF (ref 0–2)
SP GR UR STRIP: 1.02 (ref 1–1.03)
URN SPEC COLLECT METH UR: ABNORMAL
UROBILINOGEN UR STRIP-ACNC: 0.2 EU/DL (ref 0.2–1)
WBC #/AREA URNS AUTO: >100 /HPF (ref 0–2)

## 2017-04-19 PROCEDURE — 81001 URINALYSIS AUTO W/SCOPE: CPT | Performed by: FAMILY MEDICINE

## 2017-04-19 PROCEDURE — 87086 URINE CULTURE/COLONY COUNT: CPT | Performed by: FAMILY MEDICINE

## 2017-04-19 PROCEDURE — 99214 OFFICE O/P EST MOD 30 MIN: CPT | Performed by: FAMILY MEDICINE

## 2017-04-19 ASSESSMENT — PAIN SCALES - GENERAL: PAINLEVEL: NO PAIN (0)

## 2017-04-19 NOTE — MR AVS SNAPSHOT
After Visit Summary   4/19/2017    María Hays    MRN: [id number]           Patient Information     Date Of Birth          2/22/1926        Visit Information        Provider Department      4/19/2017 1:40 PM Jessy Adams MD [facility] [place]        Today's Diagnoses     Urinary tract infection associated with indwelling urethral catheter, subsequent encounter    -  1    Generalized muscle weakness        Fall, initial encounter        Type 2 diabetes mellitus without complication, without long-term current use of insulin (H)           Follow-ups after your visit        Additional Services     HOME CARE NURSING REFERRAL       **Order classes of: [facility], [facility] and [facility] will route to the Home Care and Hospice Referral Pool.  Home Care or Hospice will then contact the patient to schedule their appointment.**    If you do not hear from Home Care and Hospice, or you would like to call to schedule, please call the referring place of service that your provider has listed below.  ______________________________________________________________________    Your provider has referred you to: [facility]: [facility] [phone]   [email or web address]    Extended Emergency Contact Information  Primary Emergency Contact: Rodolfo Hays   [facility]  Home Phone: [phone]  Mobile Phone: none  Relation: Grandchild  Secondary Emergency Contact: DANIELLA HAYS   [facility]  Home Phone: [phone]  Mobile Phone: [phone]  Relation: Daughter    Patient Anticipated Discharge Date: outpatient at this time.    RN, PT, HHA to begin 24 - 48 hours after discharge.  PLEASE EVALUATE AND TREAT (Evaluation timeline is 24 - 48 hrs. Please call if there is need for a variance to this timeline).    REASON FOR REFERRAL: Assessment & Treatment: PT and Fall Risk Assessment: Assessment to be scheduled and completed within 1-2  weeks    ADDITIONAL SERVICES NEEDED: OT    OTHER PERTINENT INFORMATION: Patient was last seen by provider on 4/19/17 for weakness, falls at home.    Current Outpatient Prescriptions:  ramipril (ALTACE) 10 MG capsule, TAKE 1 CAPSULE BY MOUTH DAILY, Disp: 90 capsule, Rfl: 2  metFORMIN (GLUCOPHAGE-XR) 500 MG 24 hr tablet, TAKE 2 TABLETS BY MOUTH 2 TIMES DAILY WITH MEALS, Disp: 360 tablet, Rfl: 0  aspirin 81 MG tablet, Take 81 mg by mouth daily, Disp: , Rfl:   Coenzyme Q10 (COQ-10 PO), , Disp: , Rfl:   order for DME, Equipment being ordered: supplies for catheterization. Leg bag replacements and attachments. Dispense per insurance allowance., Disp: 3 each, Rfl: 11  blood glucose monitoring (ONE TOUCH ULTRA) test strip, TEST 3 TIMES DAILY OR AS DIRECTED, Disp: 100 each, Rfl: 5  blood glucose monitoring (ONE TOUCH DELICA) lancets, 1 each by In Vitro route 3 times daily Use to test blood sugar 3 times daily or as directed., Disp: 100 Box, Rfl: 10  CRANBERRY CONCENTRATE 500 MG CAPS, TAKE 1 CAPSULE BY MOUTH DAILY (Patient not taking: Reported on 4/19/2017), Disp: 60 capsule, Rfl: 5  Elastic Bandages & Supports (T.E.D. BELOW KNEE/M-REGULAR) MISC, Wear stockings daily, Disp: 1 Package, Rfl: 0  order for DME, Equipment being ordered: Wheelchair, Disp: 1 Units, Rfl: 0  blood glucose monitoring (ONE TOUCH ULTRA 2) meter device kit, USE TO CHECK GLUCOSE THREE TIMES DAILY OR AS DIRECTED, Disp: 1 kit, Rfl: 0  ORDER FOR DME, Equipment being ordered: catheter secure strap, please fill the elastic strap with the green velcroe closure., Disp: 2 each, Rfl: 2  Glucosamine-Chondroitin (GLUCOSAMINE CHONDR COMPLEX PO), Reported on 4/19/2017, Disp: , Rfl:   Multiple Vitamins-Minerals (OCUVITE PRESERVISION) TABS, , Disp: , Rfl:       Patient Active Problem List:     Neurogenic bladder     Lipoma of skin and subcutaneous tissue     SNHL (sensorineural hearing loss)     Hyperlipidemia LDL goal <100     Advanced care planning/counseling  discussion     Diabetic neuropathy (H)     Urinary catheter (Schultz) change required     Type 2 diabetes mellitus without complication (H)     Essential hypertension     Midline low back pain with sciatica, sciatica laterality unspecified     ACP (advance care planning)     Bilateral edema of lower extremity      Documentation of Face to Face and Certification for Home Health Services    I certify that patient, María Hill is under my care and that I, or a Nurse Practitioner or Physician's Assistant working with me, had a face-to-face encounter that meets the physician face-to-face encounter requirements with this patient on: 4/19/2017.    This encounter with the patient was in whole, or in part, for the following medical condition, which is the primary reason for Home Health Care: weakness, falls, recurrent UTIs.    I certify that, based on my findings, the following services are medically necessary Home Health Services: Occupational Therapy and Physical Therapy    My clinical findings support the need for the above services because: Occupational Therapy Services are needed to assess and treat cognitive ability and address ADL safety due to impairment in gait, stability, falls. and Physical Therapy Services are needed to assess and treat the following functional impairments: weakness, instability of gait, falls.    Further, I certify that my clinical findings support that this patient is homebound (i.e. absences from home require considerable and taxing effort and are for medical reasons or [facility] services or infrequently or of short duration when for other reasons) because: Requires assistance of another person or specialized equipment to access medical services because patient: Is unable to exit home safely on own due to: weakness, falls..    Based on the above findings, I certify that this patient is confined to the home and needs intermittent skilled nursing care, physical therapy and/or speech  therapy.  The patient is under my care, and I have initiated the establishment of the plan of care.  This patient will be followed by a physician who will periodically review the plan of care.    Physician/Provider to provide follow up care: Jessy Adams certified Physician at time of discharge: Jessy Adams MD     Please be aware that coverage of these services is subject to the terms and limitations of your health insurance plan.  Call member services at your health plan with any benefit or coverage questions.                  Your next 10 appointments already scheduled     May 03, 2017 11:00 AM CDT   Nurse Only with RG RN   [facility]    [address]  [place] [address] [phone]              Who to contact     If you have questions or need follow up information about today's clinic visit or your schedule please contact [facility] directly at [phone].  Normal or non-critical lab and imaging results will be communicated to you by Songkickhart, letter or phone within 4 business days after the clinic has received the results. If you do not hear from us within 7 days, please contact the clinic through Songkickhart or phone. If you have a critical or abnormal lab result, we will notify you by phone as soon as possible.  Submit refill requests through Gen One Cig or call your pharmacy and they will forward the refill request to us. Please allow 3 business days for your refill to be completed.          Additional Information About Your Visit        SongkickharSumoSkinny Information     Gen One Cig gives you secure access to your electronic health record. If you see a primary care provider, you can also send messages to your care team and make appointments. If you have questions, please call your primary care clinic.  If you do not have a primary care provider, please call [phone] and they will assist you.        Care EveryWhere ID   "   This is your Care EveryWhere ID. This could be used by other organizations to access your [facility] medical records  [id number]        Your Vitals Were     Pulse Temperature Respirations Height BMI (Body Mass Index)       76 99.1  F (37.3  C) (Temporal) 12 5' 1\" (1.549 m) 22.13 kg/m2        Blood Pressure from Last 3 Encounters:   04/19/17 108/70   02/01/17 120/70   12/07/16 122/80    Weight from Last 3 Encounters:   04/19/17 117 lb 1.6 oz (53.1 kg)   02/01/17 114 lb 3.2 oz (51.8 kg)   12/07/16 115 lb 3.2 oz (52.3 kg)              We Performed the Following     *UA reflex to Microscopic and Culture ([place] [facility] (except [place] and [place])     HOME CARE NURSING REFERRAL     Urine Culture Aerobic Bacterial     Urine Microscopic        Primary Care Provider Office Phone # Fax #    Jessy Adams [phone]       [facility] [address] [facility] [address] [address]        Thank you!     Thank you for choosing [facility]  for your care. Our goal is always to provide you with excellent care. Hearing back from our patients is one way we can continue to improve our services. Please take a few minutes to complete the written survey that you may receive in the mail after your visit with us. Thank you!             Your Updated Medication List - Protect others around you: Learn how to safely use, store and throw away your medicines at www.disposemymeds.org.          This list is accurate as of: 4/19/17 11:59 PM.  Always use your most recent med list.                   Brand Name Dispense Instructions for use    aspirin 81 MG tablet      Take 81 mg by mouth daily       blood glucose monitoring lancets     100 Box    1 each by In Vitro route 3 times daily Use to test blood sugar 3 times daily or as directed.       blood glucose monitoring meter device kit     1 kit    USE TO CHECK GLUCOSE THREE TIMES DAILY OR AS DIRECTED       blood glucose monitoring test strip    ONE " TOUCH ULTRA    100 each    TEST 3 TIMES DAILY OR AS DIRECTED       COQ-10 PO          CRANBERRY CONCENTRATE 500 MG Caps   Generic drug:  Cranberry     60 capsule    TAKE 1 CAPSULE BY MOUTH DAILY       GLUCOSAMINE CHONDR COMPLEX PO      Reported on 4/19/2017       metFORMIN 500 MG 24 hr tablet    GLUCOPHAGE-XR    360 tablet    TAKE 2 TABLETS BY MOUTH 2 TIMES DAILY WITH MEALS       OCUVITE PRESERVISION Tabs          * order for DME     2 each    Equipment being ordered: catheter secure strap, please fill the elastic strap with the green velcroe closure.       * order for DME     1 Units    Equipment being ordered: Wheelchair       order for DME     3 each    Equipment being ordered: supplies for catheterization. Leg bag replacements and attachments. Dispense per insurance allowance.       ramipril 10 MG capsule    ALTACE    90 capsule    TAKE 1 CAPSULE BY MOUTH DAILY       T.E.D. BELOW KNEE/M-REGULAR Misc     1 Package    Wear stockings daily       * Notice:  This list has 2 medication(s) that are the same as other medications prescribed for you. Read the directions carefully, and ask your doctor or other care provider to review them with you.

## 2017-04-19 NOTE — PROGRESS NOTES
"  SUBJECTIVE:                                                    María Hill is a 89 year old female who presents to clinic today for the following health issues:  {Provider please address medication reconciliation discrepancies--rooming staff please delete if no med/rec issues}    Diabetes Follow-up      Patient is checking blood sugars: { :995076}    Diabetic concerns: {Diabetic Concerns:381795::\"None\"}     Symptoms of hypoglycemia (low blood sugar): { :070763::\"none\"}     Paresthesias (numbness or burning in feet) or sores: { :479414::\"No\"}     Date of last diabetic eye exam: ***     Hyperlipidemia Follow-Up      Rate your low fat/cholesterol diet?: { :216357::\"good\"}    Taking statin?  { :039863::\"No\"}    Other lipid medications/supplements?:  { :754140::\"none\"}       Amount of exercise or physical activity: {Exercise frequency days per week:092357}    Problems taking medications regularly: {Med Problems:256935::\"No\"}    Medication side effects: {CHRONIC MED SIDE EFFECTS:079899::\"none\"}    Diet: { :824315}    URINARY TRACT SYMPTOMS     Onset: ***    Description:   Painful urination (Dysuria): { :547115}  Blood in urine (Hematuria): { :438408}  Delay in urine (Hesitency): { :979221}    Intensity: {.:549499}    Progression of Symptoms:  {.:171958}    Accompanying Signs & Symptoms:  Fever/chills: { :412956}  Flank pain { :833944}  Nausea and vomiting: { :600181}  Any vaginal symptoms: {.:998508::\"none\"}  Abdominal/Pelvic Pain: { :996196}   History:   History of frequent UTI's: { :974424}  History of kidney stones: { :046069}  Sexually Active: { :217345}  Possibility of pregnancy: { :484818::\"No\"}    Precipitating factors:   ***         Therapies Tried and outcome: {UTI sx treat:587955::\"***\"}        {additional problems for provider to add:528032}    Problem list and histories reviewed & adjusted, as indicated.  Additional history: {NONE - AS DOCUMENTED:891121::\"as documented\"}    {HIST REVIEW/ LINKS " 2:868099}    Reviewed and updated as needed this visit by clinical staff       Reviewed and updated as needed this visit by Provider         {PROVIDER CHARTING PREFERENCE:620448}

## 2017-04-19 NOTE — PROGRESS NOTES
SUBJECTIVE:                                                    María Hill is a 89 year old female who presents to clinic today for the following health issues:    Diabetes Follow-up      Patient is checking blood sugars: not at all    Diabetic concerns: None     Symptoms of hypoglycemia (low blood sugar): none     Paresthesias (numbness or burning in feet) or sores: No     Date of last diabetic eye exam: due       Amount of exercise or physical activity: None    Problems taking medications regularly: No    Medication side effects: none    Diet: regular (no restrictions)        Hospital Follow-up Visit:    Hospital/Nursing Home/ Rehab Facility: [place]  Date of Admission: 3/12/2017  Date of Discharge: 3/16/2017  Reason(s) for Admission: weakness, UTI    Nursing home discharge  [facility]ot  Date of Admission 3/16/17  Date of Discharge 3/31/17            Problems taking medications regularly:  None       Medication changes since discharge: None       Problems adhering to non-medication therapy:  None    Summary of hospitalization:  CareEverywhere information obtained and reviewed  Diagnostic Tests/Treatments reviewed.  Follow up needed: repeat UA  Other Healthcare Providers Involved in Patient s Care:         None  Update since discharge: improved but not back to prior level. Has finished antibiotics. Here for repeat UA. Has catheter due to urinary retention.     Post Discharge Medication Reconciliation: discharge medications reconciled, continue medications without change.  Plan of care communicated with patient and daughter.        Coding guidelines for this visit:  Type of Medical   Decision Making Face-to-Face Visit       within 7 Days of discharge Face-to-Face Visit        within 14 days of discharge   Moderate Complexity [id number]   High Complexity [id number]            Problem list and histories reviewed & adjusted, as indicated.  Additional history: as documented    Patient Active  Problem List   Diagnosis     Neurogenic bladder     Lipoma of skin and subcutaneous tissue     SNHL (sensorineural hearing loss)     Hyperlipidemia LDL goal <100     Advanced care planning/counseling discussion     Diabetic neuropathy (H)     Urinary catheter (Schultz) change required     Type 2 diabetes mellitus without complication (H)     Essential hypertension     Midline low back pain with sciatica, sciatica laterality unspecified     ACP (advance care planning)     Bilateral edema of lower extremity     Past Surgical History:   Procedure Laterality Date     ------------OTHER-------------  2004    echocardiogram - diastolic dysfunction     ------------OTHER-------------      cataract extraction - bilateral     BACK SURGERY       bilateral ear surgeries       conization of cervix      abnormal pap smear - Dr. Evangelista     EGD with biopsy  2011     EGD with biopsy, colonoscopy       RELEASE CARPAL TUNNEL  2008    left     TONSILLECTOMY         Social History   Substance Use Topics     Smoking status: Never Smoker     Smokeless tobacco: Never Used     Alcohol use No      Comment: rarely     Family History   Problem Relation Age of Onset     CANCER Mother 88     lung cancer - cause of death     CEREBROVASCULAR DISEASE Father 85     CVA - cause of death     DIABETES Sister 88     cause of death     Myocardial Infarction Brother 74     myocardial infarction - cause of death           Reviewed and updated as needed this visit by clinical staff  Tobacco  Allergies  Med Hx  Surg Hx  Fam Hx  Soc Hx      Reviewed and updated as needed this visit by Provider         ROS:  C: NEGATIVE for fever, chills, change in weight  E/M: NEGATIVE for ear, mouth and throat problems  R: NEGATIVE for significant cough or SOB  CV: NEGATIVE for chest pain, palpitations or peripheral edema  GI: NEGATIVE for nausea, abdominal pain, heartburn, or change in bowel habits  : NEGATIVE for frequency, dysuria, or hematuria  NEURO: POSITIVE for  "weakness     OBJECTIVE:                                                    /70 (BP Location: Left arm, Patient Position: Chair, Cuff Size: Adult Regular)  Pulse 76  Temp 99.1  F (37.3  C) (Temporal)  Resp 12  Ht 5' 1\" (1.549 m)  Wt 117 lb 1.6 oz (53.1 kg)  BMI 22.13 kg/m2  Body mass index is 22.13 kg/(m^2).  GENERAL APPEARANCE: alert and no distress  HENT: throat is clear, Mucous membranes are moist.   NECK: no adenopathy, no asymmetry, masses, or scars and thyroid normal to palpation  RESP: lungs clear to auscultation - no rales, rhonchi or wheezes  CV: regular rates and rhythm, normal S1 S2, no S3 or S4 and no murmur, click or rub  ABDOMEN: normal bowel sounds, soft, nontender. No rebound or guarding.   MS: extremities normal- no gross deformities noted  NEURO: generalized weakness of lower extremities. Normal sensation.     Diagnostic Test Results:  UA RESULTS:  Recent Labs   Lab Test  04/19/17   1322   COLOR  Yellow   APPEARANCE  Cloudy   URINEGLC  Negative   URINEBILI  Negative   URINEKETONE  Trace*   SG  1.025   UBLD  Large*   URINEPH  5.0   PROTEIN  100*   UROBILINOGEN  0.2   NITRITE  Negative   LEUKEST  Large*   RBCU  O - 2  UNABLE TO SEE PAST WBCS     WBCU  >100*           ASSESSMENT/PLAN:                                                          1. Urinary tract infection associated with indwelling urethral catheter, subsequent encounter  Patient with UTI and has catheter due to neurogenic bladder.   She had recent UTI with pseudomonas -2 different strains, and also E faecalis.   Finished cipro and nitrofurantoin.   She has no symptoms.   UA as noted above.   Awaiting culture results.   No changes at this time.   - *UA reflex to Microscopic and Culture ([facility] [facility] (except [place] and [place])  - Urine Microscopic  - Urine Culture Aerobic Bacterial    2. Generalized muscle weakness  Patient with generalized weakness.   Currently staying with her daughter.  She was in rehab " "prior to discharge.   She is having issues with weakness. Asking about a wheelchair.   Feels unstable and as though she will fall.   She has had a couple of falls - \"my legs just give out\"  Referral to home care for PT/OT  - HOME CARE NURSING REFERRAL    3. Fall, initial encounter  See above.   - HOME CARE NURSING REFERRAL    4. Type 2 diabetes mellitus without complication, without long-term current use of insulin (H)  No change at this time.   Recheck labs in June.           MAURICE WOODS MD, MD  [facility]  "

## 2017-04-19 NOTE — TELEPHONE ENCOUNTER
Recent UTI. Was on antibiotics and informed to follow up next week. Denies fevers, severe pain, nausea, vomiting, change in consciousness. Preferred to be seen today in case another medications would be needed based on lab results. Scheduled with  today.   Next 5 appointments (look out 90 days)     Apr 19, 2017  1:40 PM CDT   [facility] with [facility]y Adams MD   [facility]    [address], [address]  [place] [address]   [phone]            May 03, 2017 11:00 AM CDT   Nurse Only with RG RN   [facility]    [address]  [place] [address]   [phone]                Brandi Jimenez RN

## 2017-04-19 NOTE — NURSING NOTE
"Chief Complaint   Patient presents with     UTI     Recheck-not having any pain or other symptoms     Panel Management     Fall Risk, Eye Exam       Initial /70 (BP Location: Left arm, Patient Position: Chair, Cuff Size: Adult Regular)  Pulse 76  Temp 99.1  F (37.3  C) (Temporal)  Resp 12  Ht 5' 1\" (1.549 m)  Wt 117 lb 1.6 oz (53.1 kg)  BMI 22.13 kg/m2 Estimated body mass index is 22.13 kg/(m^2) as calculated from the following:    Height as of this encounter: 5' 1\" (1.549 m).    Weight as of this encounter: 117 lb 1.6 oz (53.1 kg).  Medication Reconciliation: complete  Julia Bojorquez CMA (AAMA)    "

## 2017-04-21 ENCOUNTER — TELEPHONE (OUTPATIENT)
Dept: FAMILY MEDICINE | Facility: CLINIC | Age: 82
End: 2017-04-21

## 2017-04-21 LAB
BACTERIA SPEC CULT: NORMAL
MICRO REPORT STATUS: NORMAL
SPECIMEN SOURCE: NORMAL

## 2017-04-21 NOTE — TELEPHONE ENCOUNTER
Gita from [facility] is calling, patient has orders to start PT and OT but [place] doesn't have PT on the weekends so the would like to get the ok to start on Tuesday. Please call Gita at [phone].    Thank you Nena

## 2017-04-24 ENCOUNTER — TELEPHONE (OUTPATIENT)
Dept: FAMILY MEDICINE | Facility: CLINIC | Age: 82
End: 2017-04-24

## 2017-04-24 ENCOUNTER — MEDICAL CORRESPONDENCE (OUTPATIENT)
Dept: HEALTH INFORMATION MANAGEMENT | Facility: CLINIC | Age: 82
End: 2017-04-24

## 2017-04-24 NOTE — TELEPHONE ENCOUNTER
Reason for call:  Form  Reason for Call:  Form, our goal is to have forms completed with 72 hours, however, some forms may require a visit or additional information.    Type of letter, form or note:  medical    Who is the form from?:  [facility]     Where did the form come from: form was faxed in    [phone]    Where the form was placed: 's Box    What number is listed as a contact on the form?:  [phone]       Additional comments:  Fax to [phone]    created by Laly Dickenseric

## 2017-05-03 ENCOUNTER — ALLIED HEALTH/NURSE VISIT (OUTPATIENT)
Dept: FAMILY MEDICINE | Facility: CLINIC | Age: 82
End: 2017-05-03
Payer: MEDICARE

## 2017-05-03 DIAGNOSIS — Z46.6 URINARY CATHETER CHANGE REQUIRED: Primary | ICD-10-CM

## 2017-05-03 PROCEDURE — 99207 ZZC NO CHARGE NURSE ONLY: CPT

## 2017-05-03 NOTE — MR AVS SNAPSHOT
After Visit Summary   5/3/2017    María Hill    MRN: [id number]           Patient Information     Date Of Birth          2/22/1926        Visit Information        Provider Department      5/3/2017 11:00 AM RG RN [place] Larry Hastings        Today's Diagnoses     Urinary catheter change required    -  1       Follow-ups after your visit        Who to contact     If you have questions or need follow up information about today's clinic visit or your schedule please contact [facility] directly at [phone].  Normal or non-critical lab and imaging results will be communicated to you by MyChart, letter or phone within 4 business days after the clinic has received the results. If you do not hear from us within 7 days, please contact the clinic through [employer] or phone. If you have a critical or abnormal lab result, we will notify you by phone as soon as possible.  Submit refill requests through [employer] or call your pharmacy and they will forward the refill request to us. Please allow 3 business days for your refill to be completed.          Additional Information About Your Visit        MyChart Information     [employer] gives you secure access to your electronic health record. If you see a primary care provider, you can also send messages to your care team and make appointments. If you have questions, please call your primary care clinic.  If you do not have a primary care provider, please call [phone] and they will assist you.        Care EveryWhere ID     This is your Care EveryWhere ID. This could be used by other organizations to access your [place] medical records  [id number]         Blood Pressure from Last 3 Encounters:   04/19/17 108/70   02/01/17 120/70   12/07/16 122/80    Weight from Last 3 Encounters:   04/19/17 117 lb 1.6 oz (53.1 kg)   02/01/17 114 lb 3.2 oz (51.8 kg)   12/07/16 115 lb 3.2 oz (52.3 kg)              Today, you had the following     No orders  found for display       Primary Care Provider Office Phone # Fax #    Jessy Adams [phone]       [facility] [address] [address] [address]        Thank you!     Thank you for choosing [facility]  for your care. Our goal is always to provide you with excellent care. Hearing back from our patients is one way we can continue to improve our services. Please take a few minutes to complete the written survey that you may receive in the mail after your visit with us. Thank you!             Your Updated Medication List - Protect others around you: Learn how to safely use, store and throw away your medicines at www.disposemymeds.org.          This list is accurate as of: 5/3/17 12:06 PM.  Always use your most recent med list.                   Brand Name Dispense Instructions for use    aspirin 81 MG tablet      Take 81 mg by mouth daily       blood glucose monitoring lancets     100 Box    1 each by In Vitro route 3 times daily Use to test blood sugar 3 times daily or as directed.       blood glucose monitoring meter device kit     1 kit    USE TO CHECK GLUCOSE THREE TIMES DAILY OR AS DIRECTED       blood glucose monitoring test strip    ONE TOUCH ULTRA    100 each    TEST 3 TIMES DAILY OR AS DIRECTED       COQ-10 PO          CRANBERRY CONCENTRATE 500 MG Caps   Generic drug:  Cranberry     60 capsule    TAKE 1 CAPSULE BY MOUTH DAILY       GLUCOSAMINE CHONDR COMPLEX PO      Reported on 4/19/2017       metFORMIN 500 MG 24 hr tablet    GLUCOPHAGE-XR    360 tablet    TAKE 2 TABLETS BY MOUTH 2 TIMES DAILY WITH MEALS       OCUVITE PRESERVISION Tabs          * order for DME     2 each    Equipment being ordered: catheter secure strap, please fill the elastic strap with the green velcroe closure.       * order for DME     1 Units    Equipment being ordered: Wheelchair       order for DME     3 each    Equipment being ordered: supplies for catheterization. Leg bag replacements and  attachments. Dispense per insurance allowance.       ramipril 10 MG capsule    ALTACE    90 capsule    TAKE 1 CAPSULE BY MOUTH DAILY       T.E.D. BELOW KNEE/M-REGULAR Misc     1 Package    Wear stockings daily       * Notice:  This list has 2 medication(s) that are the same as other medications prescribed for you. Read the directions carefully, and ask your doctor or other care provider to review them with you.

## 2017-05-03 NOTE — PROGRESS NOTES
Catheter removal documentation on 5/3/2017:    María Hill presents to the clinic for catheter removal.  Reason for removal: replacement  Order has been verified. SF orders  Catheter successfully removed at 11:15 AM without immediate complication.  200 cc's of urine present in the catheter bag.  Urethral meatus is free of secretions and encrustation.  The patient is afebrile.      Catheter insertion documentation on 5/3/2017:    María Hill presents to the clinic for catheter insertion.  Reason for insertion: replacement  Order has been verified. SF orders  Catheter successfully inserted into the urethral meatus in the usual sterile fashion without immediate complication.  Type of catheter placed: 16 Maori indwelling catheter  Urine is yellow in color.  25 cc's of urine output returned.  Balloon was filled with 10 cc's of normal saline.  Securement device placed for the catheter.  The patient tolerated the procedure and was instructed to follow up with their PCP or consultant as planned, monitor for catheter dysfunction, monitor for pain or discomfort, return or call for pain, fever, leakage or decreased urine flow, watch for signs of infection and collect urine sample at home and bring in for recheck to ensure no infection present if able.     Alyce Kunz

## 2017-05-11 DIAGNOSIS — E11.65 TYPE 2 DIABETES MELLITUS WITH HYPERGLYCEMIA (H): ICD-10-CM

## 2017-05-11 RX ORDER — METFORMIN HCL 500 MG
TABLET, EXTENDED RELEASE 24 HR ORAL
Qty: 360 TABLET | Refills: 1 | Status: SHIPPED | OUTPATIENT
Start: 2017-05-11 | End: 2018-01-01

## 2017-05-11 NOTE — TELEPHONE ENCOUNTER
Prescription approved per [facility] Refill Protocol.  Reagan Lopes, RN, BSN

## 2017-05-11 NOTE — TELEPHONE ENCOUNTER
metformin         Last Written Prescription Date: 02/14/17  Last Fill Quantity: 360, # refills: 0  Last Office Visit with [facility], [facility] or [facility] prescribing provider:  04/19/17        BP Readings from Last 3 Encounters:   04/19/17 108/70   02/01/17 120/70   12/07/16 122/80     Lab Results   Component Value Date    MICROL 111 06/09/2016     Lab Results   Component Value Date    UMALCR 359.22 06/09/2016     Creatinine   Date Value Ref Range Status   12/07/2016 0.65 0.52 - 1.04 mg/dL Final   ]  GFR Estimate   Date Value Ref Range Status   12/07/2016 86 >60 mL/min/1.7m2 Final     Comment:     Non  GFR Calc   05/28/2016 >90  Non  GFR Calc   >60 mL/min/1.7m2 Final   05/24/2016 88 >60 mL/min/1.7m2 Final     Comment:     Non  GFR Calc     GFR Estimate If Black   Date Value Ref Range Status   12/07/2016 >90   GFR Calc   >60 mL/min/1.7m2 Final   05/28/2016 >90   GFR Calc   >60 mL/min/1.7m2 Final   05/24/2016 >90   GFR Calc   >60 mL/min/1.7m2 Final     Lab Results   Component Value Date    CHOL 219 12/07/2016     Lab Results   Component Value Date    HDL 64 12/07/2016     Lab Results   Component Value Date     12/07/2016     Lab Results   Component Value Date    TRIG 108 12/07/2016     Lab Results   Component Value Date    CHOLHDLRATIO 2.6 05/06/2015     Lab Results   Component Value Date    AST 13 12/07/2016     Lab Results   Component Value Date    ALT 25 12/07/2016     Lab Results   Component Value Date    A1C 7.0 12/07/2016    A1C 7.0 06/09/2016    A1C 7.8 12/09/2015    A1C 7.3 08/13/2015    A1C 7.3 05/06/2015     Potassium   Date Value Ref Range Status   12/07/2016 3.8 3.4 - 5.3 mmol/L Final

## 2017-06-06 ENCOUNTER — ALLIED HEALTH/NURSE VISIT (OUTPATIENT)
Dept: FAMILY MEDICINE | Facility: CLINIC | Age: 82
End: 2017-06-06
Payer: MEDICARE

## 2017-06-06 VITALS — DIASTOLIC BLOOD PRESSURE: 92 MMHG | HEART RATE: 84 BPM | SYSTOLIC BLOOD PRESSURE: 165 MMHG

## 2017-06-06 DIAGNOSIS — N31.9 NEUROGENIC BLADDER: ICD-10-CM

## 2017-06-06 PROCEDURE — 99207 ZZC NO CHARGE NURSE ONLY: CPT

## 2017-06-06 PROCEDURE — 51702 INSERT TEMP BLADDER CATH: CPT

## 2017-06-06 NOTE — MR AVS SNAPSHOT
After Visit Summary   6/6/2017    María Hill    MRN: [id number]           Patient Information     Date Of Birth          2/22/1926        Visit Information        Provider Department      6/6/2017 10:30 AM CORINA BRUNO [facility]        Today's Diagnoses     Neurogenic bladder           Follow-ups after your visit        Your next 10 appointments already scheduled     Jul 05, 2017 10:30 AM CDT   Nurse Only with RG RN   [facility] [place] [facility]    [address]  [place] [address] [phone]              Who to contact     If you have questions or need follow up information about today's clinic visit or your schedule please contact [facility] directly at [phone].  Normal or non-critical lab and imaging results will be communicated to you by MyChart, letter or phone within 4 business days after the clinic has received the results. If you do not hear from us within 7 days, please contact the clinic through [employer] or phone. If you have a critical or abnormal lab result, we will notify you by phone as soon as possible.  Submit refill requests through [employer] or call your pharmacy and they will forward the refill request to us. Please allow 3 business days for your refill to be completed.          Additional Information About Your Visit        MyChart Information     [employer] gives you secure access to your electronic health record. If you see a primary care provider, you can also send messages to your care team and make appointments. If you have questions, please call your primary care clinic.  If you do not have a primary care provider, please call [phone] and they will assist you.        Care EveryWhere ID     This is your Care EveryWhere ID. This could be used by other organizations to access your [place] medical records  [id number]        Your Vitals Were     Pulse                   84            Blood Pressure  from Last 3 Encounters:   06/06/17 (!) 165/92   04/19/17 108/70   02/01/17 120/70    Weight from Last 3 Encounters:   04/19/17 117 lb 1.6 oz (53.1 kg)   02/01/17 114 lb 3.2 oz (51.8 kg)   12/07/16 115 lb 3.2 oz (52.3 kg)              We Performed the Following     INSERT BLADDER CATH, TEMP INDWELL SIMPLE (BELTRE)        Primary Care Provider Office Phone # Fax #    Jessy Adams [phone]       [facility] [address] [address] [address]        Thank you!     Thank you for choosing [facility]  for your care. Our goal is always to provide you with excellent care. Hearing back from our patients is one way we can continue to improve our services. Please take a few minutes to complete the written survey that you may receive in the mail after your visit with us. Thank you!             Your Updated Medication List - Protect others around you: Learn how to safely use, store and throw away your medicines at www.disposemymeds.org.          This list is accurate as of: 6/6/17 10:43 AM.  Always use your most recent med list.                   Brand Name Dispense Instructions for use    aspirin 81 MG tablet      Take 81 mg by mouth daily       blood glucose monitoring lancets     100 Box    1 each by In Vitro route 3 times daily Use to test blood sugar 3 times daily or as directed.       blood glucose monitoring meter device kit     1 kit    USE TO CHECK GLUCOSE THREE TIMES DAILY OR AS DIRECTED       blood glucose monitoring test strip    ONE TOUCH ULTRA    100 each    TEST 3 TIMES DAILY OR AS DIRECTED       COQ-10 PO          CRANBERRY CONCENTRATE 500 MG Caps   Generic drug:  Cranberry     60 capsule    TAKE 1 CAPSULE BY MOUTH DAILY       GLUCOSAMINE CHONDR COMPLEX PO      Reported on 4/19/2017       metFORMIN 500 MG 24 hr tablet    GLUCOPHAGE-XR    360 tablet    TAKE 2 TABLETS BY MOUTH 2 TIMES DAILY WITH MEALS       OCUVITE PRESERVISION Tabs          * order for DME     2 each     Equipment being ordered: catheter secure strap, please fill the elastic strap with the green velcroe closure.       * order for DME     1 Units    Equipment being ordered: Wheelchair       order for DME     3 each    Equipment being ordered: supplies for catheterization. Leg bag replacements and attachments. Dispense per insurance allowance.       ramipril 10 MG capsule    ALTACE    90 capsule    TAKE 1 CAPSULE BY MOUTH DAILY       T.E.D. BELOW KNEE/M-REGULAR Misc     1 Package    Wear stockings daily       * Notice:  This list has 2 medication(s) that are the same as other medications prescribed for you. Read the directions carefully, and ask your doctor or other care provider to review them with you.

## 2017-06-06 NOTE — PROGRESS NOTES
Catheter removal documentation on 6/6/2017:    María Hill presents to the clinic for catheter removal.  Reason for removal: replacement  Order has been verified. SF Order  Catheter successfully removed at 9:28 AM without immediate complication.  200 cc's of urine present in the catheter bag.  Urethral meatus is free of secretions and encrustation.  The patient is afebrile.  The patient tolerated the procedure and was instructed to monitor for catheter dysfunction, monitor for pain or discomfort, return or call for pain, fever, leakage or decreased urine flow and watch for signs of infection    Brandi Jimenez RN, BSN         Catheter insertion documentation on 6/6/2017:    María Hill presents to the clinic for catheter insertion.  Reason for insertion: replacement  Order has been verified. SF order released.   Catheter successfully inserted into the urethral meatus in the usual sterile fashion without immediate complication.  Type of catheter placed: 16 Maltese indwelling catheter  Urine is clear to yellow with sediment in color.  20 cc's of urine output returned.  Balloon was filled with 10cc's of normal saline.  Securement device placed for the catheter.  The patient tolerated the procedure and was instructed to monitor for catheter dysfunction, monitor for pain or discomfort, return or call for pain, fever, leakage or decreased urine flow and watch for signs of infection    Brandi Jimenez RN, BSN

## 2017-06-08 ENCOUNTER — TRANSFERRED RECORDS (OUTPATIENT)
Dept: HEALTH INFORMATION MANAGEMENT | Facility: CLINIC | Age: 82
End: 2017-06-08

## 2017-07-05 ENCOUNTER — TELEPHONE (OUTPATIENT)
Dept: FAMILY MEDICINE | Facility: CLINIC | Age: 82
End: 2017-07-05

## 2017-07-05 NOTE — TELEPHONE ENCOUNTER
Patient was scheduled for an RN appointment for a catheter change. Did not arrive to appointment. RN LM reaching out to patient to see if needing assistance with rescheduling appointment. Brandi Jimenez RN, BSN

## 2017-07-06 NOTE — TELEPHONE ENCOUNTER
Followed up with pt daughter. Pt is currently in a care center. Pt daughter will give us a call if pt is needing the catheter changed.   Tamiko Crespo MA  July 6, 2017

## 2017-07-13 NOTE — PROGRESS NOTES
SUBJECTIVE:                                                    María Hill is a 89 year old female who presents to clinic today for the following health issues:        Hospital Follow-up Visit:    Hospital/Nursing Home/IP Rehab Facility: [facility]  Date of Admission: 6/08/2017  Date of Discharge: 6/09/2017  Reason(s) for Admission: left elbow fracture            Problems taking medications regularly:  None       Medication changes since discharge: Seroquel and Oxycodone       Problems adhering to non-medication therapy:  None    Summary of hospitalization:  CareEverywhere information obtained and reviewed  Diagnostic Tests/Treatments reviewed.  Follow up needed: none  Other Healthcare Providers Involved in Patient s Care:         Homecare  Update since discharge: improved. Cast was removed last week. Starting PT and OT at home tomorrow. She is living with her daughter. She is doing well. Tylenol occasionally for pain.     Post Discharge Medication Reconciliation: discharge medications reconciled, continue medications without change.  Plan of care communicated with patient and family     Coding guidelines for this visit:  Type of Medical   Decision Making Face-to-Face Visit       within 7 Days of discharge Face-to-Face Visit        within 14 days of discharge   Moderate Complexity [id number]   High Complexity 99946 32344            Diabetes Follow-up      Patient is checking blood sugars: was checked in the rehab facility    Diabetic concerns: None     Symptoms of hypoglycemia (low blood sugar): none     Paresthesias (numbness or burning in feet) or sores: Yes no changes        Problem list and histories reviewed & adjusted, as indicated.  Additional history: as documented    BP Readings from Last 3 Encounters:   07/18/17 118/60   06/06/17 (!) 165/92   04/19/17 108/70    Wt Readings from Last 3 Encounters:   07/18/17 113 lb 11.2 oz (51.6 kg)   04/19/17 117 lb 1.6 oz (53.1 kg)   02/01/17 114 lb 3.2 oz  (51.8 kg)                    Reviewed and updated as needed this visit by clinical staff       Reviewed and updated as needed this visit by Provider         ROS:  C: NEGATIVE for fever, chills, change in weight  E: NEGATIVE for vision changes or irritation  E/M: NEGATIVE for ear, mouth and throat problems  R: NEGATIVE for significant cough or SOB  CV: NEGATIVE for chest pain, palpitations or peripheral edema  GI: NEGATIVE for nausea, abdominal pain, heartburn, or change in bowel habits  MUSCULOSKELETAL:as above.     OBJECTIVE:     /60  Pulse 92  Temp 99.2  F (37.3  C) (Temporal)  Resp 12  Wt 113 lb 11.2 oz (51.6 kg)  BMI 21.48 kg/m2  Body mass index is 21.48 kg/(m^2).  GENERAL: healthy, alert and no distress  HENT: throat is clear, Mucous membranes are moist.   NECK: no adenopathy, no asymmetry, masses, or scars and thyroid normal to palpation  RESP: lungs clear to auscultation - no rales, rhonchi or wheezes  CV: regular rate and rhythm, normal S1 S2, no S3 or S4, no murmur, click or rub, no peripheral edema and peripheral pulses strong  MS: left arm with tenderness proximal to the elbow. Has decreased range of motion of the elbow and also some swelling of the hand.    Diagnostic Test Results:  Results for orders placed or performed in visit on 07/18/17 (from the past 24 hour(s))   Hemoglobin A1c   Result Value Ref Range    Hemoglobin A1C 6.7 (H) 4.3 - 6.0 %   ** Albumin Random Urine Quant FUTURE 1yr   Result Value Ref Range    Creatinine Urine 97 mg/dL    Albumin Urine mg/L 88 mg/L    Albumin Urine mg/g Cr 90.39 (H) 0 - 25 mg/g Cr       ASSESSMENT/PLAN:         1. Closed displaced simple supracondylar fracture of left humerus without intercondylar fracture with routine healing, subsequent encounter  Patient with history of fracture.   Has been in rehab and just came out of a cast.   Her pain is well controlled at this time.   She has been on tylenol only periodically.   She is scheduled to start PT and  OT tomorrow.   Will have her recheck here as needed.   All questions invited, asked and answered to the patient's apparent satisfaction.  Patient agrees to plan.      2. Type 2 diabetes mellitus with other neurologic complication, without long-term current use of insulin (H)  Controlled A1C. No changes in plan.   Elevated microalbumin with history of this.   No change in plan at this time.   - Hemoglobin A1c  - ** Albumin Random Urine Quant FUTURE 1yr    3. Neurogenic bladder  Cath care with home health care for now.         MAURICE WOODS MD, MD  [facility]

## 2017-07-18 NOTE — MR AVS SNAPSHOT
After Visit Summary   7/18/2017    María Hill    MRN: [id number]           Patient Information     Date Of Birth          2/22/1926        Visit Information        Provider Department      7/18/2017 1:20 PM Jessy Adams MD [facility]        Today's Diagnoses     Type 2 diabetes mellitus without complication, without long-term current use of insulin (H)    -  1    Neurogenic bladder           Follow-ups after your visit        Who to contact     If you have questions or need follow up information about today's clinic visit or your schedule please contact [facility] directly at [phone].  Normal or non-critical lab and imaging results will be communicated to you by [employer], letter or phone within 4 business days after the clinic has received the results. If you do not hear from us within 7 days, please contact the clinic through [employer] or phone. If you have a critical or abnormal lab result, we will notify you by phone as soon as possible.  Submit refill requests through "[employer]" or call your pharmacy and they will forward the refill request to us. Please allow 3 business days for your refill to be completed.          Additional Information About Your Visit        MyChart Information     "[employer]" gives you secure access to your electronic health record. If you see a primary care provider, you can also send messages to your care team and make appointments. If you have questions, please call your primary care clinic.  If you do not have a primary care provider, please call [phone] and they will assist you.        Care EveryWhere ID     This is your Care EveryWhere ID. This could be used by other organizations to access your [place] medical records  [id number]        Your Vitals Were     Pulse Temperature Respirations BMI (Body Mass Index)          92 99.2  F (37.3  C) (Temporal) 12 21.48 kg/m2         Blood Pressure from Last 3 Encounters:   07/18/17  118/60   06/06/17 (!) 165/92   04/19/17 108/70    Weight from Last 3 Encounters:   07/18/17 113 lb 11.2 oz (51.6 kg)   04/19/17 117 lb 1.6 oz (53.1 kg)   02/01/17 114 lb 3.2 oz (51.8 kg)              We Performed the Following     ** Albumin Random Urine Quant FUTURE 1yr     Hemoglobin A1c          Today's Medication Changes          These changes are accurate as of: 7/18/17  2:41 PM.  If you have any questions, ask your nurse or doctor.               Stop taking these medicines if you haven't already. Please contact your care team if you have questions.     QUEtiapine 25 MG tablet   Commonly known as:  SEROquel   Stopped by:  Jessy Adams MD                    Primary Care Provider Office Phone # Fax #    Jessy Adams [phone]       [facility] [id number] [place] [id number]        Equal Access to Services     : Hadii aad ku hadasho Soomaali, waaxda luqadaha, qaybta kaalmada adeegyada, waxay idiin hayleon iron cam . So [facility] [phone].    ATENCIÓN: Si habla español, tiene a johns disposición servicios gratuitos de asistencia lingüística. Llame al [phone].    We comply with applicable federal civil rights laws and [place] laws. We do not discriminate on the basis of race, color, national origin, age, disability sex, sexual orientation or gender identity.            Thank you!     Thank you for choosing [facility]  for your care. Our goal is always to provide you with excellent care. Hearing back from our patients is one way we can continue to improve our services. Please take a few minutes to complete the written survey that you may receive in the mail after your visit with us. Thank you!             Your Updated Medication List - Protect others around you: Learn how to safely use, store and throw away your medicines at www.disposemymeds.org.          This list is accurate as of: 7/18/17  2:41 PM.  Always use your most recent med  list.                   Brand Name Dispense Instructions for use Diagnosis    aspirin 81 MG tablet      Take 81 mg by mouth daily        blood glucose monitoring lancets     100 Box    1 each by In Vitro route 3 times daily Use to test blood sugar 3 times daily or as directed.    Type 2 diabetes mellitus with hyperglycemia (H)       blood glucose monitoring meter device kit     1 kit    USE TO CHECK GLUCOSE THREE TIMES DAILY OR AS DIRECTED    Diabetes mellitus, type 2 (H)       blood glucose monitoring test strip    ONE TOUCH ULTRA    100 each    TEST 3 TIMES DAILY OR AS DIRECTED    Type 2 diabetes mellitus with hyperglycemia (H)       COQ-10 PO           CRANBERRY CONCENTRATE 500 MG Caps   Generic drug:  Cranberry     60 capsule    TAKE 1 CAPSULE BY MOUTH DAILY    Health care maintenance       GLUCOSAMINE CHONDR COMPLEX PO      Reported on 4/19/2017        metFORMIN 500 MG 24 hr tablet    GLUCOPHAGE-XR    360 tablet    TAKE 2 TABLETS BY MOUTH 2 TIMES DAILY WITH MEALS    Type 2 diabetes mellitus with hyperglycemia (H)       OCUVITE PRESERVISION Tabs           * order for DME     2 each    Equipment being ordered: catheter secure strap, please fill the elastic strap with the green velcroe closure.    Urinary retention with incomplete bladder emptying       * order for DME     1 Units    Equipment being ordered: Wheelchair    DDD (degenerative disc disease), lumbar       order for DME     3 each    Equipment being ordered: supplies for catheterization. Leg bag replacements and attachments. Dispense per insurance allowance.    Neurogenic bladder       ramipril 10 MG capsule    ALTACE    90 capsule    TAKE 1 CAPSULE BY MOUTH DAILY    Essential hypertension with goal blood pressure less than 140/90       T.E.D. BELOW KNEE/M-REGULAR Misc     1 Package    Wear stockings daily    Left leg swelling       * Notice:  This list has 2 medication(s) that are the same as other medications prescribed for you. Read the directions  carefully, and ask your doctor or other care provider to review them with you.

## 2017-07-18 NOTE — TELEPHONE ENCOUNTER
Reason for Call: Request for an order or referral:    Order or referral being requested: orders    Date needed: as soon as possible    Has the patient been seen by the PCP for this problem? YES    Additional comments: Home care is seeing and they need orders to take care of her Cath.  They are wondering if she should still be taking her Asprin.  Please call      Best Time:  any    Can we leave a detailed message on this number?  YES    Call taken on 7/18/2017 at 4:30 PM by Genesis Granger

## 2017-07-18 NOTE — PROGRESS NOTES
Late Entry:  3:20pm - Urine sample obtained from cath per direction of provider.  Sent to lab.    Maile Mitchell RN, BSN

## 2017-07-18 NOTE — NURSING NOTE
"Chief Complaint   Patient presents with     Hospital F/U     Panel Management     HINA, Statin, A1c, Microalbumin, Eye Exam       Initial /60  Pulse 92  Temp 99.2  F (37.3  C) (Temporal)  Resp 12  Wt 113 lb 11.2 oz (51.6 kg)  BMI 21.48 kg/m2 Estimated body mass index is 21.48 kg/(m^2) as calculated from the following:    Height as of 4/19/17: 5' 1\" (1.549 m).    Weight as of this encounter: 113 lb 11.2 oz (51.6 kg).  Medication Reconciliation: complete  Julia Bojorquez CMA (AAMA)    "

## 2017-07-19 NOTE — TELEPHONE ENCOUNTER
Reason for call:  Form  Reason for Call:  Form, our goal is to have forms completed with 72 hours, however, some forms may require a visit or additional information.    Type of letter, form or note:  medical    Who is the form from?: [facility]    Where did the form come from: form was faxed in    What clinic location was the form placed at?: [facility] - [phone]    Where the form was placed:  in box     What number is listed as a contact on the form?: [phone]       Additional comments: Fax to [phone]    Call taken on 11/8/2016 at 3:08 PM by Shruthi Lion

## 2017-07-19 NOTE — TELEPHONE ENCOUNTER
Left detailed message on Erna RNs voicemail per okay of voicemail recording informing her of message from Dr. Adams in regards to patient. Advised Erna to call with any further questions.      Enma Khan MA

## 2017-07-19 NOTE — TELEPHONE ENCOUNTER
Reason for call:  Form  Reason for Call:  Form, our goal is to have forms completed with 72 hours, however, some forms may require a visit or additional information.    Type of letter, form or note:  medical    Who is the form from?: [facility]    Where did the form come from: form was faxed in    What clinic location was the form placed at?: [facility] - [phone]    Where the form was placed:  in box     What number is listed as a contact on the form?: [phone]       Additional comments: Fax to [phone]    Call taken on 11/8/2016 at 3:08 PM by Shruthi Lion

## 2017-07-19 NOTE — TELEPHONE ENCOUNTER
Form placed in provider's box in nurse's station. Julia Bojorquez CMA ([facility])

## 2017-07-20 NOTE — TELEPHONE ENCOUNTER
Reason for call:  Form  Reason for Call:  Form, our goal is to have forms completed with 72 hours, however, some forms may require a visit or additional information.    Type of letter, form or note:  medical    Who is the form from?: [facility]     Where did the form come from: form was faxed in    What clinic location was the form placed at?: [facility] - [phone]    Where the form was placed:  in box     What number is listed as a contact on the form?: [phone]       Additional comments: Fax to [phone]    Call taken on 11/8/2016 at 3:08 PM by Shruthi Lion

## 2017-07-21 NOTE — TELEPHONE ENCOUNTER
Reason for call:  Form  Reason for Call:  Form, our goal is to have forms completed with 72 hours, however, some forms may require a visit or additional information.    Type of letter, form or note:  Medical    Who is the form from?: [facility]      Where did the form come from: form was faxed in    What clinic location was the form placed at?: [facility] - [phone]    Where the form was placed: 's in box     What number is listed as a contact on the form?: [phone]        Additional comments: Fax back to [phone]    Call taken on 7/21/2017 at 4:01 PM by Foreign Khan

## 2017-08-22 NOTE — TELEPHONE ENCOUNTER
Reason for call:  Form  Reason for Call:  Form, our goal is to have forms completed with 72 hours, however, some forms may require a visit or additional information.    Type of letter, form or note:  Medical    Who is the form from?: [facility]      Where did the form come from: form was faxed in    What clinic location was the form placed at?: [facility] - [phone]    Where the form was placed: 's in box     What number is listed as a contact on the form?: [phone]       Additional comments: Fax back to [phone]    Call taken on 8/22/2017 at 3:56 PM by Foreign Khan

## 2017-08-25 NOTE — TELEPHONE ENCOUNTER
Adonay, home care coordinator from [facility], called stating pt was discharged from the hospital and a ames catheter was placed.    Pt's urine is cloudy, increased odor with some sediment in the bag.  Pt does not have a fever and no pain.  It was noted that pt does have a history of UTI's and has not had pain.  Adonay is requesting an order for a UA/UC to be placed by Dr. Adams.  He just needs a verbal order that it is okay to do a UA/UC.  Will route to provider for approval.    Bobbi Estrada RN

## 2017-08-25 NOTE — PROGRESS NOTES
SUBJECTIVE:                                                    María Hill is a 89 year old female who presents to clinic today for the following health issues:      Diabetes Follow-up      Patient is checking blood sugars: not at all since the care center     Diabetic concerns: None     Symptoms of hypoglycemia (low blood sugar): none     Paresthesias (numbness or burning in feet) or sores: No     Date of last diabetic eye exam: 2 years ago    She and her daughter are concerned about her feet.       Hyperlipidemia Follow-Up      Rate your low fat/cholesterol diet?: good    Taking statin?  No    Other lipid medications/supplements?:  none    Hypertension Follow-up      Outpatient blood pressures are not being checked.    Low Salt Diet: no added salt        Amount of exercise or physical activity: with the physical therapy and OT twice a week     Problems taking medications regularly: No    Medication side effects: none  Diet: regular (no restrictions), no added salt           Hospital Follow-up Visit:    Hospital/Nursing Home/IP Rehab Facility: [facility]   Date of Admission: 8/6/17  Date of Discharge: 8/10/17 and patient went into [facility]   Reason(s) for Admission: patient fell and disorientated             Problems taking medications regularly:  None       Medication changes since discharge: ciprofloxacin - started on for UTI       Problems adhering to non-medication therapy:  PT and OT twice a week     Summary of hospitalization:  CareEverywhere information obtained and reviewed  Diagnostic Tests/Treatments reviewed.  Follow up needed: had recent urine culture.   Other Healthcare Providers Involved in Patient s Care:         Homecare and Physical Therapy  Update since discharge: improving. She is doing better. Moving around better with assistance per daughter. She has therapy at home. She has improving appetite.     Post Discharge Medication Reconciliation: discharge medications  "reconciled, continue medications without change.  Plan of care communicated with patient and family     Coding guidelines for this visit:  Type of Medical   Decision Making Face-to-Face Visit       within 7 Days of discharge Face-to-Face Visit        within 14 days of discharge   Moderate Complexity 51360 [id number]   High Complexity 45861 51852              Problem list and histories reviewed & adjusted, as indicated.  Additional history: as documented    BP Readings from Last 3 Encounters:   08/30/17 150/70   07/18/17 118/60   06/06/17 (!) 165/92    Wt Readings from Last 3 Encounters:   08/30/17 115 lb (52.2 kg)   07/18/17 113 lb 11.2 oz (51.6 kg)   04/19/17 117 lb 1.6 oz (53.1 kg)            SINUSITIS- sudafed and benadryl. Congested. Mucous is \"like rubber cement\". Cough. No fever known. Has issues in the fall at times.        ROS:  C: NEGATIVE for fever, chills, change in weight  INTEGUMENTARY/SKIN: as above.   ENT/MOUTH: as above.   R: NEGATIVE for significant cough or SOB  CV: NEGATIVE for chest pain, palpitations or peripheral edema  GI: NEGATIVE for nausea, abdominal pain, heartburn, or change in bowel habits  N: NEGATIVE for weakness, dizziness or paresthesias    OBJECTIVE:     /70  Pulse 83  Temp 99  F (37.2  C) (Temporal)  Wt 115 lb (52.2 kg)  SpO2 97%  BMI 21.73 kg/m2  Body mass index is 21.73 kg/(m^2).  GENERAL: healthy, alert and no distress  HENT: throat is clear, Mucous membranes are moist. Clear post nasal drainage. No sinus tenderness.   NECK: no adenopathy, no asymmetry, masses, or scars and thyroid normal to palpation  RESP: lungs clear to auscultation - no rales, rhonchi or wheezes  CV: regular rate and rhythm, normal S1 S2, no S3 or S4, no murmur, click or rub, no peripheral edema and peripheral pulses strong  ABDOMEN: soft, nontender, no hepatosplenomegaly, no masses and bowel sounds normal  MS: no gross musculoskeletal defects noted, no edema  SKIN: toenails are thickened and yellow on " each foot.     Diagnostic Test Results:  none     ASSESSMENT/PLAN:         1. Urinary tract infection associated with indwelling urethral catheter, initial encounter (H)  Urine culture is reviewed.   She is on appropriate antibiotics at this time.   She is to finish the entire course.   If continued issues once completed, will repeat UA and culture. She has home health care and this can be done with them.  All questions invited, asked and answered to the patient's apparent satisfaction.  Patient agrees to plan.      2. Onychomycosis  Discussed treatments available for this issue.   Do not feel oral medication is appropriate due to other medications and age.   Topical would be difficult.   Discussed foot care and given information about [facility].   They will contact.     3. Acute non-recurrent maxillary sinusitis  Likely viral vs allergies.   Discussed that she may continue with current plan. May add saline nasal spray to help.     4. Other diabetic neurological complication associated with type 2 diabetes mellitus (H)  Well controlled at this time.   No changes in therapy.   Recheck in January 2018. Sooner if needed.     5. Essential hypertension  Doing well. Well controlled. Tolerating medication.  No change in plan.      6. Hyperlipidemia LDL goal <100  Doing well. Well controlled. Tolerating medication.  No change in plan.          MAURICE WOODS MD, MD  [facility]

## 2017-08-25 NOTE — TELEPHONE ENCOUNTER
Reason for call:  Form  Reason for Call:  Form, our goal is to have forms completed with 72 hours, however, some forms may require a visit or additional information.    Type of letter, form or note:  medical    Who is the form from?: [facility]    Where did the form come from: form was faxed in    What clinic location was the form placed at?: [facility] - [phone]    Where the form was placed:  in box     What number is listed as a contact on the form?: [phone]       Additional comments: Fax to [phone]    Call taken on 11/8/2016 at 3:08 PM by Shruthi Lion

## 2017-08-25 NOTE — TELEPHONE ENCOUNTER
No contact number was taken for us to call Adonay back     Called [phone] - as found on a recent forms encounter.    Left detailed message informing Erna - home care nurse with verbal ok for orders

## 2017-08-27 NOTE — TELEPHONE ENCOUNTER
PC from [facility] nurse - urine ordered from office yesterday and results are back today.  Culture pending.  Reports urine is cloudy with sediment but no fever and no pain described.  She has chronic indwelling catheter.    WBC - packed on micro.  Ray Est - 3+      Cipro - 250 mg twice a day for 7 days.    Await culture result to determine if antibiotics need to continue and monitor for symptoms.    Early April + culture,  Mid April - negative culture.    PSK    migueli to PCP.

## 2017-08-28 NOTE — TELEPHONE ENCOUNTER
Reason for call:  Form  Reason for Call:  Form, our goal is to have forms completed with 72 hours, however, some forms may require a visit or additional information.    Type of letter, form or note:  medical    Who is the form from?: [facility]    Where did the form come from: form was faxed in    What clinic location was the form placed at?: [facility] - [phone]    Where the form was placed:  in box     What number is listed as a contact on the form?: [phone]       Additional comments: Fax to [phone]    Call taken on 11/8/2016 at 3:08 PM by Shruthi Lion

## 2017-08-28 NOTE — TELEPHONE ENCOUNTER
Reason for call:  Form  Reason for Call:  Form, our goal is to have forms completed with 72 hours, however, some forms may require a visit or additional information.    Type of letter, form or note:  medical    Who is the form from?: [facility]    Where did the form come from: form was faxed in    What clinic location was the form placed at?: [facility] - [phone]    Where the form was placed:  in box     What number is listed as a contact on the form?: [phone]       Additional comments: Fax to [phone]    Call taken on 11/8/2016 at 3:08 PM by Shruthi Lion

## 2017-08-29 NOTE — TELEPHONE ENCOUNTER
Received another set of forms for pt from [facility]  Placed in Provider inbox to complete    Fax back to [phone]

## 2017-08-30 NOTE — PATIENT INSTRUCTIONS
[facility] [phone]. Please call to schedule.   Continue with Cipro for urinary tract infection. Recheck urine test is needed.   Can use saline nasal for congestion. Or may try Flonase.   Recheck in 3 months or sooner if needed.

## 2017-08-30 NOTE — MR AVS SNAPSHOT
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 Please call to schedule.   Continue with Cipro for urinary tract infection. Recheck urine test is needed.   Can use saline nasal for congestion. Or may try Flonase.   Recheck in 3 months or sooner if needed.           Follow-ups after your visit        Who to contact     If you have questions or need follow up information about today's clinic visit or your schedule please [facility] at [phone].  Normal or non-critical lab and imaging results will be communicated to you by Occipitalhart, letter or phone within 4 business days after the clinic has received the results. If you do not hear from us within 7 days, please contact the clinic through Ubiquisyst or phone. If you have a critical or abnormal lab result, we will notify you by phone as soon as possible.  Submit refill requests through Snaps or call your pharmacy and they will forward the refill request to us. Please allow 3 business days for your refill to be completed.          Additional Information About Your Visit        Occipitalhart Information     Snaps gives you secure access to your electronic health record. If you see a primary care provider, you can also send messages to your care team and make appointments. If you have questions, please call your primary care clinic.  If you do not have a primary care  provider, please call [phone] and they will assist you.        Care EveryWhere ID     This is your Care EveryWhere ID. This could be used by other organizations to access your [place] medical records  [id number]        Your Vitals Were     Pulse Temperature Pulse Oximetry BMI (Body Mass Index)          83 99  F (37.2  C) (Temporal) 97% 21.73 kg/m2         Blood Pressure from Last 3 Encounters:   08/30/17 150/70   07/18/17 118/60   06/06/17 (!) 165/92    Weight from Last 3 Encounters:   08/30/17 115 lb (52.2 kg)   07/18/17 113 lb 11.2 oz (51.6 kg)   04/19/17 117 lb 1.6 oz (53.1 kg)              Today, you had the following     No orders found for display       Primary Care Provider Office Phone # Fax #    Jessy Adams [phone] [address] [facility] [address]        Equal Access to Services     Mission Bernal campusSAMIRA : Hadii aad ku hadasho Soomaali, waaxda luqadaha, qaybta kaalmada adeegyada, waxay idiin hayaan adeeg kharajono cam . So [facility] [phone].    ATENCIÓN: Si habla español, tiene a johns disposición servicios gratuitos de asistencia lingüística. Llame al [phone].    We comply with applicable federal civil rights laws and [place] laws. We do not discriminate on the basis of race, color, national origin, age, disability sex, sexual orientation or gender identity.            Thank you!     Thank you for choosing [facility]  for your care. Our goal is always to provide you with excellent care. Hearing back from our patients is one way we can continue to improve our services. Please take a few minutes to complete the written survey that you may receive in the mail after your visit with us. Thank you!             Your Updated Medication List - Protect others around you: Learn how to safely use, store and throw away your medicines at www.disposemymeds.org.          This list is accurate as of: 8/30/17 11:12 AM.  Always use your most recent med list.                    Brand Name Dispense Instructions for use Diagnosis    acetaminophen 325 MG tablet    TYLENOL          aspirin 81 MG tablet      Take 81 mg by mouth daily        blood glucose monitoring lancets     100 Box    1 each by In Vitro route 3 times daily Use to test blood sugar 3 times daily or as directed.    Type 2 diabetes mellitus with hyperglycemia (H)       blood glucose monitoring meter device kit     1 kit    USE TO CHECK GLUCOSE THREE TIMES DAILY OR AS DIRECTED    Diabetes mellitus, type 2 (H)       blood glucose monitoring test strip    ONE TOUCH ULTRA    100 each    TEST 3 TIMES DAILY OR AS DIRECTED    Type 2 diabetes mellitus with hyperglycemia (H)       ciprofloxacin 250 MG tablet    CIPRO          COQ-10 PO           CRANBERRY CONCENTRATE 500 MG Caps   Generic drug:  Cranberry     60 capsule    TAKE 1 CAPSULE BY MOUTH DAILY    Health care maintenance       GLUCOSAMINE CHONDR COMPLEX PO      Reported on 4/19/2017        Lactobacillus Acidophilus Powd      Take 1 Packet by mouth twice daily with food.        metFORMIN 500 MG 24 hr tablet    GLUCOPHAGE-XR    360 tablet    TAKE 2 TABLETS BY MOUTH 2 TIMES DAILY WITH MEALS    Type 2 diabetes mellitus with hyperglycemia (H)       OCUVITE PRESERVISION Tabs           * order for DME     2 each    Equipment being ordered: catheter secure strap, please fill the elastic strap with the green velcroe closure.    Urinary retention with incomplete bladder emptying       * order for DME     1 Units    Equipment being ordered: Wheelchair    DDD (degenerative disc disease), lumbar       order for DME     3 each    Equipment being ordered: supplies for catheterization. Leg bag replacements and attachments. Dispense per insurance allowance.    Neurogenic bladder       QUEtiapine 25 MG tablet    SEROquel     Take 25 mg by mouth once daily at bedtime. May take additional 25 mg three times daily as needed for agitation, hallucinations        ramipril 10 MG capsule    ALTACE     90 capsule    TAKE 1 CAPSULE BY MOUTH DAILY    Essential hypertension with goal blood pressure less than 140/90       T.E.D. BELOW KNEE/M-REGULAR Misc     1 Package    Wear stockings daily    Left leg swelling       * Notice:  This list has 2 medication(s) that are the same as other medications prescribed for you. Read the directions carefully, and ask your doctor or other care provider to review them with you.

## 2017-09-01 NOTE — TELEPHONE ENCOUNTER
Reason for call:  Form  Reason for Call:  Form, our goal is to have forms completed with 72 hours, however, some forms may require a visit or additional information.    Type of letter, form or note:  medical    Who is the form from?: [facility]    Where did the form come from: form was faxed in    What clinic location was the form placed at?: [facility] - [phone]    Where the form was placed:  in box     What number is listed as a contact on the form?: [phone]       Additional comments: Fax to [phone]    Call taken on 11/8/2016 at 3:08 PM by Shruthi Lion

## 2017-09-06 NOTE — TELEPHONE ENCOUNTER
Spoke with Erna [facility] care nurse, she reports that pt finished her last antibiotic yesterday.  Pt continues to have dark urine and mucous in her tubing of the catheter.    On Dr. Adams's plan at the 8/30/2017 office visit, it says: continue entire course of antibiotics.  If continued issues once completed will repeat UA and culture.  Will route to Dr. Adams to see if she wants a UA and UC ordered or if she would like to send in another round of antibiotics.    Bobbi Estrada RN

## 2017-09-06 NOTE — TELEPHONE ENCOUNTER
Reason for call: Please call Erna regarding still a cloudy and mucus urine thru cath.  They would like to know if you would like to reorder another round of cipro.

## 2017-09-06 NOTE — TELEPHONE ENCOUNTER
Reason for call:  Form  Reason for Call:  Form, our goal is to have forms completed with 72 hours, however, some forms may require a visit or additional information.    Type of letter, form or note:  medical    Who is the form from?: [facility]    Where did the form come from: form was faxed in    What clinic location was the form placed at?: [facility] - [phone]    Where the form was placed:  in box     What number is listed as a contact on the form?: [phone]       Additional comments: Fax to [phone]    Call taken on 11/8/2016 at 3:08 PM by Shruthi Lion

## 2017-09-07 NOTE — TELEPHONE ENCOUNTER
Erna from [facility] calling to make sure you received the UA results she faxed today. Please let her know if you want to call in antibiotics. Ph # [phone].  Thank you,  Rocio Carmona- Pt Rep.

## 2017-09-07 NOTE — TELEPHONE ENCOUNTER
RON for Erna from [facility]. The form that was faxed to  was for a UA order, but there were no results attached to this form. Will await to hear from Erna. Brandi Jimenez, RN, BSN

## 2017-09-08 NOTE — TELEPHONE ENCOUNTER
Erna from [facility] states that she faxed over the UA results and would like to know if Dr. Adams would like to order any antibiotics?

## 2017-09-08 NOTE — TELEPHONE ENCOUNTER
Huddled with provider who reviewed results. Provider stated she would like to start pt on cipro 500mg BID for 10 days and do a recollect for culture as this appeared somewhat contaminated. Spoke with Erna and gave information above. She stated they will recollect for culture on Monday when they go out for home care visit and will let daughter of pt know she can  Rx this evening or tomorrow morning. Pt uses [facility]. Pharmacy entered for provider to send antibiotic.    Doorthy Lopez CMA ([facility])

## 2017-09-08 NOTE — TELEPHONE ENCOUNTER
Shruthi Lion Non-Clinical Signed    Date of Service: 09/08/2017 1:11 PM Creation Time: 09/08/2017 1:11 PM          Erna from [facility] states that she faxed over the UA results and would like to know if Dr. Adams would like to order any antibiotics?            Left detailed message for Erna stating that we did not get the results just a UA order. Asked for results to be faxed over again for provider to review and advise for antibiotic.    Dorothy Lopez CMA ([facility])

## 2017-09-11 NOTE — TELEPHONE ENCOUNTER
Reason for call:  Form  Reason for Call:  Form, our goal is to have forms completed with 72 hours, however, some forms may require a visit or additional information.    Type of letter, form or note:  Medical    Who is the form from?:  [facility]     Where did the form come from: form was faxed in    What clinic location was the form placed at?: [facility] - [phone]    Where the form was placed: 's in box     What number is listed as a contact on the form?: [phone]       Additional comments: Fax back to [phone]    Call taken on 9/11/2017 at 9:59 AM by Foreign Khan

## 2017-09-11 NOTE — TELEPHONE ENCOUNTER
Tried calling [employer] to inform of message below, was after hours. Please try again tomorrow, thanks   Harika Patel RT (R)

## 2017-09-11 NOTE — TELEPHONE ENCOUNTER
Earnestine from [facility] calling. She would like the OK for orders for 1 more visit a week for 2 more weeks to make sure her UTI is getting better. Please call [phone] if there is a problem, otherwise she will assume it is OK.  Thank you,  Rocio Carmona- Pt Rep.

## 2017-09-12 NOTE — TELEPHONE ENCOUNTER
Reason for call:  Form  Reason for Call:  Form, our goal is to have forms completed with 72 hours, however, some forms may require a visit or additional information.    Type of letter, form or note:  medical    Who is the form from?: [facility]    Where did the form come from: form was faxed in    What clinic location was the form placed at?: [facility] - [phone]    Where the form was placed:  in box     What number is listed as a contact on the form?: [phone]       Additional comments: Fax to [phone]    Call taken on 11/8/2016 at 3:08 PM by Shruthi Lion

## 2017-09-12 NOTE — TELEPHONE ENCOUNTER
Reason for call:  Form  Reason for Call:  Form, our goal is to have forms completed with 72 hours, however, some forms may require a visit or additional information.    Type of letter, form or note:  medical    Who is the form from?: [facility]    Where did the form come from: form was faxed in    What clinic location was the form placed at?: [facility] - [phone]    Where the form was placed:  in box     What number is listed as a contact on the form?: [phone]       Additional comments: Fax to [phone]    Call taken on 11/8/2016 at 3:08 PM by Shruthi Lion

## 2017-09-13 NOTE — TELEPHONE ENCOUNTER
Reason for call:  Form  Reason for Call:  Form, our goal is to have forms completed with 72 hours, however, some forms may require a visit or additional information.    Type of letter, form or note:  medical    Who is the form from?: [facility]    Where did the form come from: form was faxed in    What clinic location was the form placed at?: [facility] - [phone]    Where the form was placed:  in box     What number is listed as a contact on the form?: [phone]       Additional comments: Fax to [phone]    Call taken on 11/8/2016 at 3:08 PM by Shruthi Lion

## 2017-09-18 NOTE — TELEPHONE ENCOUNTER
Received results from urine culture.     New bacteria is growing from the urine. This is different from the last infection. It is resistant to the Cipro that she is currently on.    Stop the cipro. Start nitrofurantoin twice daily for 10 days. This has been sent to her pharmacy.

## 2017-09-18 NOTE — TELEPHONE ENCOUNTER
Reason for call:  Form  Reason for Call:  Form, our goal is to have forms completed with 72 hours, however, some forms may require a visit or additional information.    Type of letter, form or note:  Medical    Who is the form from?:[facility]       Where did the form come from: form was faxed in    What clinic location was the form placed at?: [facility] [address] - [phone]    Where the form was placed: 's in box     What number is listed as a contact on the form?: [phone]       Additional comments: Fax back to [phone]    Call taken on 9/18/2017 at 9:12 AM by Foreign Khan

## 2017-09-19 NOTE — TELEPHONE ENCOUNTER
Patient is scheduled to have a Catheter change on the RN schedule. Standing order has . Please review and place Catheter change and replace orders. Last Catheter change documented in clinic dated 2017.   Next 5 appointments (look out 90 days)     Sep 20, 2017 10:00 AM CDT   Nurse Only with NL RN TEAM A, [facility]   [facility]    [address] [address]   [phone]                 Brandi Jimenez, RN, BSN

## 2017-09-20 NOTE — Clinical Note
Routing to provider as FYI. Patient had a catheter changer today and tolerated well. Brandi Jimenez RN, BSN

## 2017-09-20 NOTE — MR AVS SNAPSHOT
After Visit Summary   9/20/2017    María Hill    MRN: [id number]           Patient Information     Date Of Birth          2/22/1926        Visit Information        Provider Department      9/20/2017 10:00 AM NL RN TEAM A, DOMINIC [facility]        Today's Diagnoses     Neurogenic bladder           Follow-ups after your visit        Who to contact     If you have questions or need follow up information about today's clinic visit or your schedule please contact [facility] directly at [phone].  Normal or non-critical lab and imaging results will be communicated to you by Flip Flop ShopsÂ®hart, letter or phone within 4 business days after the clinic has received the results. If you do not hear from us within 7 days, please contact the clinic through Peeriust or phone. If you have a critical or abnormal lab result, we will notify you by phone as soon as possible.  Submit refill requests through Movaya or call your pharmacy and they will forward the refill request to us. Please allow 3 business days for your refill to be completed.          Additional Information About Your Visit        MyChart Information     Movaya gives you secure access to your electronic health record. If you see a primary care provider, you can also send messages to your care team and make appointments. If you have questions, please call your primary care clinic.  If you do not have a primary care provider, please call [phone] and they will assist you.        Care EveryWhere ID     This is your Care EveryWhere ID. This could be used by other organizations to access your [place] medical records  [id number]         Blood Pressure from Last 3 Encounters:   08/30/17 150/70   07/18/17 118/60   06/06/17 (!) 165/92    Weight from Last 3 Encounters:   08/30/17 115 lb (52.2 kg)   07/18/17 113 lb 11.2 oz (51.6 kg)   04/19/17 117 lb 1.6 oz (53.1 kg)              We Performed the Following     INSERT BLADDER  SNEHA MICEHL (ALEXSANDER)        Primary Care Provider Office Phone # Fax #    Jessy SOLANO MD Angie [phone] [address] [facility] [address]        Equal Access to Services     JENNY RASMUSSEN : Hadii aad ku hadasho Soomaali, waaxda luqadaha, qaybta kaalmada adeegyada, waxsalty idiin hayleon adesamra ohara laYanelyever santizo. So [facility] [phone].    ATENCIÓN: Si habla español, tiene a johns disposición servicios gratuitos de asistencia lingüística. Llame al [phone].    We comply with applicable federal civil rights laws and [place] laws. We do not discriminate on the basis of race, color, national origin, age, disability sex, sexual orientation or gender identity.            Thank you!     Thank you for choosing [facility]  for your care. Our goal is always to provide you with excellent care. Hearing back from our patients is one way we can continue to improve our services. Please take a few minutes to complete the written survey that you may receive in the mail after your visit with us. Thank you!             Your Updated Medication List - Protect others around you: Learn how to safely use, store and throw away your medicines at www.disposemymeds.org.          This list is accurate as of: 9/20/17 10:08 AM.  Always use your most recent med list.                   Brand Name Dispense Instructions for use Diagnosis    acetaminophen 325 MG tablet    TYLENOL          aspirin 81 MG tablet      Take 81 mg by mouth daily        blood glucose monitoring lancets     100 Box    1 each by In Vitro route 3 times daily Use to test blood sugar 3 times daily or as directed.    Type 2 diabetes mellitus with hyperglycemia (H)       blood glucose monitoring meter device kit     1 kit    USE TO CHECK GLUCOSE THREE TIMES DAILY OR AS DIRECTED    Diabetes mellitus, type 2 (H)       blood glucose monitoring test strip    ONE TOUCH ULTRA    100 each    TEST 3 TIMES DAILY OR AS DIRECTED    Type 2  diabetes mellitus with hyperglycemia (H)       * ciprofloxacin 250 MG tablet    CIPRO          * ciprofloxacin 500 MG tablet    CIPRO    20 tablet    Take 1 tablet (500 mg) by mouth 2 times daily    Urinary tract infection associated with indwelling urethral catheter, subsequent encounter       COQ-10 PO           CRANBERRY CONCENTRATE 500 MG Caps   Generic drug:  Cranberry     60 capsule    TAKE 1 CAPSULE BY MOUTH DAILY    Health care maintenance       GLUCOSAMINE CHONDR COMPLEX PO      Reported on 4/19/2017        Lactobacillus Acidophilus Powd      Take 1 Packet by mouth twice daily with food.        metFORMIN 500 MG 24 hr tablet    GLUCOPHAGE-XR    360 tablet    TAKE 2 TABLETS BY MOUTH 2 TIMES DAILY WITH MEALS    Type 2 diabetes mellitus with hyperglycemia (H)       nitroFURantoin (macrocrystal-monohydrate) 100 MG capsule    MACROBID    20 capsule    Take 1 capsule (100 mg) by mouth 2 times daily for 10 days    Urinary tract infection associated with indwelling urethral catheter, subsequent encounter       OCUVITE PRESERVISION Tabs           * order for DME     1 Units    Equipment being ordered: Wheelchair    DDD (degenerative disc disease), lumbar       * order for DME     2 each    Equipment being ordered: catheter secure strap, please fill the elastic strap with the green velcroe closure.    Urinary retention with incomplete bladder emptying       order for DME     3 each    Equipment being ordered: supplies for catheterization. Leg bag replacements and attachments. Dispense per insurance allowance.    Neurogenic bladder       QUEtiapine 25 MG tablet    SEROquel     Take 25 mg by mouth once daily at bedtime. May take additional 25 mg three times daily as needed for agitation, hallucinations        ramipril 10 MG capsule    ALTACE    90 capsule    TAKE 1 CAPSULE BY MOUTH DAILY    Essential hypertension with goal blood pressure less than 140/90       T.E.D. BELOW KNEE/M-REGULAR Misc     1 Package    Wear  stockings daily    Left leg swelling       * Notice:  This list has 4 medication(s) that are the same as other medications prescribed for you. Read the directions carefully, and ask your doctor or other care provider to review them with you.

## 2017-09-20 NOTE — PROGRESS NOTES
Catheter removal documentation on 9/20/2017:    María Hill presents to the clinic for catheter removal.  Reason for removal: replacement  Order has been verified. SF order, standing released  Catheter successfully removed at 09:35 AM without immediate complication.  20-25 cc's of urine present in the catheter bag.  Urethral meatus is free of secretions and encrustation.  The patient is afebrile.  The patient tolerated the procedure and was instructed to monitor for catheter dysfunction, monitor for pain or discomfort, return or call for pain, fever, leakage or decreased urine flow and watch for signs of infection    Catheter insertion documentation on 9/20/2017:    María Hill presents to the clinic for catheter insertion.  Reason for insertion: replacement  Order has been verified. SF order - standing released  Catheter successfully inserted into the urethral meatus in the usual sterile fashion without immediate complication.  Type of catheter placed: 16 Welsh indwelling catheter  Urine is yellow with sediment in color.  10 cc's of urine output returned.  Balloon was filled with 10cc's of normal saline.  Securement device placed for the catheter.  The patient tolerated the procedure and was instructed to monitor for catheter dysfunction, monitor for pain or discomfort, return or call for pain, fever, leakage or decreased urine flow and watch for signs of infection    Patient stated her left leg feels like she pulled a muscle, had moderate difficulty bending the left knee for catheter change and left foot had mild redness and swelling. Stated the leg discomfort and foot redness started about 2 days ago and has not changed. Discussed following up in clinic by the end of the week if the foot redness and swelling do not resolve with home care measures.     Patient will return to the clinic for a catheter change in 1 month.    Brandi Jimenez, RN, BSN

## 2017-09-25 NOTE — TELEPHONE ENCOUNTER
Reason for call:  Form  Reason for Call:  Form, our goal is to have forms completed with 72 hours, however, some forms may require a visit or additional information.    Type of letter, form or note:  medical    Who is the form from?: [facility]    Where did the form come from: form was faxed in    What clinic location was the form placed at?: [facility] - [phone]    Where the form was placed:  in box     What number is listed as a contact on the form?: [phone]       Additional comments: Fax to [phone]    Call taken on 11/8/2016 at 3:08 PM by Shruthi Lion

## 2017-10-16 NOTE — Clinical Note
Increased sediment, denies UTI symptoms. No action needed, huddled with SF in clinic. Urine collected and not needed at this time. Brandi Jimenez, RN, BSN

## 2017-10-16 NOTE — MR AVS SNAPSHOT
After Visit Summary   10/16/2017    María Hill    MRN: [id number]           Patient Information     Date Of Birth          2/22/1926        Visit Information        Provider Department      10/16/2017 10:30 AM CORINA BRUNO [facility]        Today's Diagnoses     Neurogenic bladder           Follow-ups after your visit        Your next 10 appointments already scheduled     Nov 14, 2017 10:30 AM CST   Nurse Only with RG RN   [facility] [place] ([facility])    [id number] [address], [address]  [place] [id number] [phone]              Who to contact     If you have questions or need follow up information about today's clinic visit or your schedule please contact [facility] directly at [phone].  Normal or non-critical lab and imaging results will be communicated to you by BeLocal[employer], letter or phone within 4 business days after the clinic has received the results. If you do not hear from us within 7 days, please contact the clinic through BeLocalhart or phone. If you have a critical or abnormal lab result, we will notify you by phone as soon as possible.  Submit refill requests through [employer] or call your pharmacy and they will forward the refill request to us. Please allow 3 business days for your refill to be completed.          Additional Information About Your Visit        MyChart Information     [employer] gives you secure access to your electronic health record. If you see a primary care provider, you can also send messages to your care team and make appointments. If you have questions, please call your primary care clinic.  If you do not have a primary care provider, please call [phone] and they will assist you.        Care EveryWhere ID     This is your Care EveryWhere ID. This could be used by other organizations to access your [place] medical records  [id number]         Blood Pressure from Last 3 Encounters:   08/30/17 150/70   07/18/17  118/60   06/06/17 (!) 165/92    Weight from Last 3 Encounters:   08/30/17 115 lb (52.2 kg)   07/18/17 113 lb 11.2 oz (51.6 kg)   04/19/17 117 lb 1.6 oz (53.1 kg)              We Performed the Following     INSERT BLADDER CATH, TEMP INDWELL SIMPLE (BELTRE)        Primary Care Provider Office Phone # Fax #    Jessy Adams [phone] [address] [address]        Equal Access to Services     [facility]: Hadii aad ku hadasho Soomaali, waaxda luqadaha, qaybta kaalmada adeegyada, waxsalty jay hayever cam . So [facility] [phone].    ATENCIÓN: Si habla español, tiene a johns disposición servicios gratuitos de asistencia lingüística. [facility] [phone].    We comply with applicable federal civil rights laws and [place] laws. We do not discriminate on the basis of race, color, national origin, age, disability, sex, sexual orientation, or gender identity.            Thank you!     Thank you for choosing [facility]  for your care. Our goal is always to provide you with excellent care. Hearing back from our patients is one way we can continue to improve our services. Please take a few minutes to complete the written survey that you may receive in the mail after your visit with us. Thank you!             Your Updated Medication List - Protect others around you: Learn how to safely use, store and throw away your medicines at www.disposemymeds.org.          This list is accurate as of: 10/16/17 10:53 AM.  Always use your most recent med list.                   Brand Name Dispense Instructions for use Diagnosis    acetaminophen 325 MG tablet    TYLENOL          aspirin 81 MG tablet      Take 81 mg by mouth daily        blood glucose monitoring lancets     100 Box    1 each by In Vitro route 3 times daily Use to test blood sugar 3 times daily or as directed.    Type 2 diabetes mellitus with hyperglycemia (H)       blood glucose monitoring meter device kit      1 kit    USE TO CHECK GLUCOSE THREE TIMES DAILY OR AS DIRECTED    Diabetes mellitus, type 2 (H)       blood glucose monitoring test strip    ONE TOUCH ULTRA    100 each    TEST 3 TIMES DAILY OR AS DIRECTED    Type 2 diabetes mellitus with hyperglycemia (H)       * ciprofloxacin 250 MG tablet    CIPRO          * ciprofloxacin 500 MG tablet    CIPRO    20 tablet    Take 1 tablet (500 mg) by mouth 2 times daily    Urinary tract infection associated with indwelling urethral catheter, subsequent encounter       COQ-10 PO           CRANBERRY CONCENTRATE 500 MG Caps   Generic drug:  Cranberry     60 capsule    TAKE 1 CAPSULE BY MOUTH DAILY    Health care maintenance       GLUCOSAMINE CHONDR COMPLEX PO      Reported on 4/19/2017        Lactobacillus Acidophilus Powd      Take 1 Packet by mouth twice daily with food.        metFORMIN 500 MG 24 hr tablet    GLUCOPHAGE-XR    360 tablet    TAKE 2 TABLETS BY MOUTH 2 TIMES DAILY WITH MEALS    Type 2 diabetes mellitus with hyperglycemia (H)       OCUVITE PRESERVISION Tabs           * order for DME     1 Units    Equipment being ordered: Wheelchair    DDD (degenerative disc disease), lumbar       * order for DME     2 each    Equipment being ordered: catheter secure strap, please fill the elastic strap with the green velcroe closure.    Urinary retention with incomplete bladder emptying       order for DME     3 each    Equipment being ordered: supplies for catheterization. Leg bag replacements and attachments. Dispense per insurance allowance.    Neurogenic bladder       QUEtiapine 25 MG tablet    SEROquel     Take 25 mg by mouth once daily at bedtime. May take additional 25 mg three times daily as needed for agitation, hallucinations        ramipril 10 MG capsule    ALTACE    90 capsule    TAKE 1 CAPSULE BY MOUTH DAILY    Essential hypertension with goal blood pressure less than 140/90       T.E.D. BELOW KNEE/M-REGULAR Misc     1 Package    Wear stockings daily    Left leg  swelling       * Notice:  This list has 4 medication(s) that are the same as other medications prescribed for you. Read the directions carefully, and ask your doctor or other care provider to review them with you.

## 2017-10-16 NOTE — PROGRESS NOTES
Catheter removal documentation on 10/16/2017:    María Hill presents to the clinic for catheter removal.  Reason for removal: replacement  Order has been verified. SF order  Catheter successfully removed at 10:20 AM without immediate complication.  150 cc's of urine present in the catheter bag, large amounts of sediment in tubing.  Urethral meatus is free of secretions and encrustation.  The patient is afebrile.    Catheter insertion documentation on 10/16/2017:    María Hill presents to the clinic for catheter insertion.  Reason for insertion: replacement  Order has been verified. SF Order  Catheter successfully inserted into the urethral meatus in the usual sterile fashion without immediate complication.  Type of catheter placed: 16 English indwelling catheter  Urine is clear-yellow in color.  30 cc's of urine output returned.  Balloon was filled with 10cc's of normal saline.  Securement device placed for the catheter.  The patient tolerated the procedure and was instructed to monitor for catheter dysfunction, monitor for pain or discomfort, return or call for pain, fever, leakage or decreased urine flow and watch for signs of infection    Brandi Jimenez RN, BSN

## 2017-11-01 NOTE — TELEPHONE ENCOUNTER
"Received this form back from [facility] - note states \"cleaning up older items, please sign and fax back\"    This form was already signed, but perhaps not easily visible to the staff at [facility].    Please sign again.  "

## 2017-11-14 NOTE — PROGRESS NOTES
Catheter removal documentation on 11/14/2017:    María Hill presents to the clinic for catheter removal.  Reason for removal: replacement  Order has been verified. SF order released  Catheter successfully removed at 1:24 PM without immediate complication.  400 cc's of urine present in the catheter bag.  Urethral meatus is free of secretions and encrustation.  The patient is afebrile.  The patient tolerated the procedure and was instructed to follow up with their PCP or consultant as planned, monitor for catheter dysfunction, monitor for pain or discomfort, return or call for pain, fever, leakage or decreased urine flow and watch for signs of infection      Catheter insertion documentation on 11/14/2017:    María Hill presents to the clinic for catheter insertion.  Reason for insertion: replacement  Order has been verified. SF Order released  Catheter successfully inserted into the urethral meatus in the usual sterile fashion without immediate complication.  Type of catheter placed: 16 Ukrainian indwelling catheter  Urine is yellow with scant sediment in color.  100 cc's of urine output returned.  Balloon was filled with 10cc's of normal saline.  Securement device placed for the catheter.  The patient tolerated the procedure and was instructed to follow up with their PCP or consultant as planned, monitor for catheter dysfunction, monitor for pain or discomfort, return or call for pain, fever, leakage or decreased urine flow and watch for signs of infection    Brandi Jimenez RN, BSN       Injectable Influenza Immunization Documentation    1.  Is the person to be vaccinated sick today?   No    2. Does the person to be vaccinated have an allergy to a component   of the vaccine?   No  Egg Allergy Algorithm Link    3. Has the person to be vaccinated ever had a serious reaction   to influenza vaccine in the past?   No    4. Has the person to be vaccinated ever had Guillain-Barré syndrome?   No    Form  completed by Brandi Jimenez RN, BSN

## 2017-11-14 NOTE — MR AVS SNAPSHOT
After Visit Summary   11/14/2017    María Hill    MRN: [id number]           Patient Information     Date Of Birth          2/22/1926        Visit Information        Provider Department      11/14/2017 1:30 PM CORINA BRUNO [place] [facility]        Today's Diagnoses     Need for prophylactic vaccination and inoculation against influenza    -  1    Neurogenic bladder           Follow-ups after your visit        Your next 10 appointments already scheduled     Dec 14, 2017  2:00 PM CST   Nurse Only with RG RN   [facility] [place] ([facility]    [address], [address]  [place] [address] [phone]              Who to contact     If you have questions or need follow up information about today's clinic visit or your schedule please contact [facility] [place] directly at [phone].  Normal or non-critical lab and imaging results will be communicated to you by Phone [employer], letter or phone within 4 business days after the clinic has received the results. If you do not hear from us within 7 days, please contact the clinic through Phone Warrior[employer] or phone. If you have a critical or abnormal lab result, we will notify you by phone as soon as possible.  Submit refill requests through [email or web address] or call your pharmacy and they will forward the refill request to us. Please allow 3 business days for your refill to be completed.          Additional Information About Your Visit        MyChart Information     [email or web address] gives you secure access to your electronic health record. If you see a primary care provider, you can also send messages to your care team and make appointments. If you have questions, please call your primary care clinic.  If you do not have a primary care provider, please call [phone] and they will assist you.        Care EveryWhere ID     This is your Care EveryWhere ID. This could be used by other organizations to access your [facility]  records  [id number]         Blood Pressure from Last 3 Encounters:   08/30/17 150/70   07/18/17 118/60   06/06/17 (!) 165/92    Weight from Last 3 Encounters:   08/30/17 115 lb (52.2 kg)   07/18/17 113 lb 11.2 oz (51.6 kg)   04/19/17 117 lb 1.6 oz (53.1 kg)              We Performed the Following     ADMIN INFLUENZA (For MEDICARE Patients ONLY) []     FLU VACCINE, INCREASED ANTIGEN, PRESV FREE, AGE 65+ [51508]     INSERT BLADDER CATH, TEMP INDWELL SIMPLE (BELTRE)        Primary Care Provider Office Phone # Fax #    Jessy Adams [phone] [address] [facility] [address]        Equal Access to Services     [facility]: Hadii aad ku hadasho Soomaali, waaxda luqadaha, qaybta kaalmada adeegyada, waxsalty cam . So [facility] [phone].    ATENCIÓN: Si habla español, tiene a johns disposición servicios gratuitos de asistencia lingüística. Saulo al [phone].    We comply with applicable federal civil rights laws and [place] laws. We do not discriminate on the basis of race, color, national origin, age, disability, sex, sexual orientation, or gender identity.            Thank you!     Thank you for choosing [facility]  for your care. Our goal is always to provide you with excellent care. Hearing back from our patients is one way we can continue to improve our services. Please take a few minutes to complete the written survey that you may receive in the mail after your visit with us. Thank you!             Your Updated Medication List - Protect others around you: Learn how to safely use, store and throw away your medicines at www.disposemymeds.org.          This list is accurate as of: 11/14/17  1:54 PM.  Always use your most recent med list.                   Brand Name Dispense Instructions for use Diagnosis    acetaminophen 325 MG tablet    TYLENOL          aspirin 81 MG tablet      Take 81 mg by mouth daily        blood glucose monitoring  lancets     100 Box    1 each by In Vitro route 3 times daily Use to test blood sugar 3 times daily or as directed.    Type 2 diabetes mellitus with hyperglycemia (H)       blood glucose monitoring meter device kit     1 kit    USE TO CHECK GLUCOSE THREE TIMES DAILY OR AS DIRECTED    Diabetes mellitus, type 2 (H)       blood glucose monitoring test strip    ONETOUCH ULTRA    100 each    TEST 3 TIMES DAILY OR AS DIRECTED    Type 2 diabetes mellitus with hyperglycemia (H)       * ciprofloxacin 250 MG tablet    CIPRO          * ciprofloxacin 500 MG tablet    CIPRO    20 tablet    Take 1 tablet (500 mg) by mouth 2 times daily    Urinary tract infection associated with indwelling urethral catheter, subsequent encounter       COQ-10 PO           CRANBERRY CONCENTRATE 500 MG Caps   Generic drug:  Cranberry     60 capsule    TAKE 1 CAPSULE BY MOUTH DAILY    Health care maintenance       GLUCOSAMINE CHONDR COMPLEX PO      Reported on 4/19/2017        Lactobacillus Acidophilus Powd      Take 1 Packet by mouth twice daily with food.        metFORMIN 500 MG 24 hr tablet    GLUCOPHAGE-XR    360 tablet    TAKE 2 TABLETS BY MOUTH 2 TIMES DAILY WITH MEALS    Type 2 diabetes mellitus with hyperglycemia (H)       OCUVITE PRESERVISION Tabs           * order for DME     1 Units    Equipment being ordered: Wheelchair    DDD (degenerative disc disease), lumbar       * order for DME     2 each    Equipment being ordered: catheter secure strap, please fill the elastic strap with the green velcroe closure.    Urinary retention with incomplete bladder emptying       order for DME     3 each    Equipment being ordered: supplies for catheterization. Leg bag replacements and attachments. Dispense per insurance allowance.    Neurogenic bladder       QUEtiapine 25 MG tablet    SEROquel     Take 25 mg by mouth once daily at bedtime. May take additional 25 mg three times daily as needed for agitation, hallucinations        ramipril 10 MG capsule     ALTACE    90 capsule    TAKE 1 CAPSULE BY MOUTH DAILY    Essential hypertension with goal blood pressure less than 140/90       T.E.D. BELOW KNEE/M-REGULAR Misc     1 Package    Wear stockings daily    Left leg swelling       * Notice:  This list has 4 medication(s) that are the same as other medications prescribed for you. Read the directions carefully, and ask your doctor or other care provider to review them with you.

## 2017-12-19 NOTE — TELEPHONE ENCOUNTER
Left message asking patient or daughter to return call.    Please inform them of note below  Ask if they want a referral placed for pt to have OT - to be evaluated for a wheelchair.

## 2017-12-19 NOTE — TELEPHONE ENCOUNTER
Pt here for cath change. Patient is wondering if able to receive a small wheelchair to get around as walking is becoming more difficult for her.    Maile Valverde, RN, BSN

## 2017-12-19 NOTE — TELEPHONE ENCOUNTER
To be evaluated for a wheelchair, would recommend that occupational therapy evaluate her. If she is not longer receiving this service, then can refer her for this.

## 2017-12-19 NOTE — MR AVS SNAPSHOT
After Visit Summary   12/19/2017    María Hill    MRN: [id number]           Patient Information     Date Of Birth          2/22/1926        Visit Information        Provider Department      12/19/2017 10:30 AM CORINA BRUNO [facility] [place] [facility]        Today's Diagnoses     Neurogenic bladder           Follow-ups after your visit        Your next 10 appointments already scheduled     Jan 19, 2018 11:00 AM CST   Nurse Only with RG RN   [facility] [place] ([facility]    [address], [address]  [place] [id number] [phone]              Who to contact     If you have questions or need follow up information about today's clinic visit or your schedule please contact [facility] directly at [phone].  Normal or non-critical lab and imaging results will be communicated to you by [email or web address], letter or phone within 4 business days after the clinic has received the results. If you do not hear from us within 7 days, please contact the clinic through [email or web address] or phone. If you have a critical or abnormal lab result, we will notify you by phone as soon as possible.  Submit refill requests through [employer] or call your pharmacy and they will forward the refill request to us. Please allow 3 business days for your refill to be completed.          Additional Information About Your Visit        MyChart Information     [employer] gives you secure access to your electronic health record. If you see a primary care provider, you can also send messages to your care team and make appointments. If you have questions, please call your primary care clinic.  If you do not have a primary care provider, please call [phone] and they will assist you.        Care EveryWhere ID     This is your Care EveryWhere ID. This could be used by other organizations to access your [place] medical records  [id number]         Blood Pressure from Last 3 Encounters:   08/30/17 150/70   07/18/17  118/60   06/06/17 (!) 165/92    Weight from Last 3 Encounters:   08/30/17 115 lb (52.2 kg)   07/18/17 113 lb 11.2 oz (51.6 kg)   04/19/17 117 lb 1.6 oz (53.1 kg)              We Performed the Following     INSERT BLADDER CATH, TEMP INDWELL SIMPLE (BELTRE)        Primary Care Provider Office Phone # Fax #    Jessy Adams [phone] [address] [address]        Equal Access to Services     [facility]: Hadii aad ku hadasho Soomaali, waaxda luqadaha, qaybta kaalmada adeegyada, waxsalty jay hayever cam . So [facility] [place] [phone].    ATENCIÓN: Si habla español, tiene a johns disposición servicios gratuitos de asistencia lingüística. [facility] [phone].    We comply with applicable federal civil rights laws and [place] laws. We do not discriminate on the basis of race, color, national origin, age, disability, sex, sexual orientation, or gender identity.            Thank you!     Thank you for choosing [facility]  for your care. Our goal is always to provide you with excellent care. Hearing back from our patients is one way we can continue to improve our services. Please take a few minutes to complete the written survey that you may receive in the mail after your visit with us. Thank you!             Your Updated Medication List - Protect others around you: Learn how to safely use, store and throw away your medicines at www.disposemymeds.org.          This list is accurate as of: 12/19/17 10:46 AM.  Always use your most recent med list.                   Brand Name Dispense Instructions for use Diagnosis    acetaminophen 325 MG tablet    TYLENOL          aspirin 81 MG tablet      Take 81 mg by mouth daily        blood glucose monitoring lancets     100 Box    1 each by In Vitro route 3 times daily Use to test blood sugar 3 times daily or as directed.    Type 2 diabetes mellitus with hyperglycemia (H)       blood glucose monitoring meter device kit      1 kit    USE TO CHECK GLUCOSE THREE TIMES DAILY OR AS DIRECTED    Diabetes mellitus, type 2 (H)       blood glucose monitoring test strip    ONETOUCH ULTRA    100 each    TEST 3 TIMES DAILY OR AS DIRECTED    Type 2 diabetes mellitus with hyperglycemia (H)       * ciprofloxacin 250 MG tablet    CIPRO          * ciprofloxacin 500 MG tablet    CIPRO    20 tablet    Take 1 tablet (500 mg) by mouth 2 times daily    Urinary tract infection associated with indwelling urethral catheter, subsequent encounter       COQ-10 PO           CRANBERRY CONCENTRATE 500 MG Caps   Generic drug:  Cranberry     60 capsule    TAKE 1 CAPSULE BY MOUTH DAILY    Health care maintenance       GLUCOSAMINE CHONDR COMPLEX PO      Reported on 4/19/2017        Lactobacillus Acidophilus Powd      Take 1 Packet by mouth twice daily with food.        metFORMIN 500 MG 24 hr tablet    GLUCOPHAGE-XR    360 tablet    TAKE 2 TABLETS BY MOUTH 2 TIMES DAILY WITH MEALS    Type 2 diabetes mellitus with hyperglycemia (H)       OCUVITE PRESERVISION Tabs           * order for DME     1 Units    Equipment being ordered: Wheelchair    DDD (degenerative disc disease), lumbar       * order for DME     2 each    Equipment being ordered: catheter secure strap, please fill the elastic strap with the green velcroe closure.    Urinary retention with incomplete bladder emptying       order for DME     3 each    Equipment being ordered: supplies for catheterization. Leg bag replacements and attachments. Dispense per insurance allowance.    Neurogenic bladder       QUEtiapine 25 MG tablet    SEROquel     Take 25 mg by mouth once daily at bedtime. May take additional 25 mg three times daily as needed for agitation, hallucinations        ramipril 10 MG capsule    ALTACE    90 capsule    TAKE 1 CAPSULE BY MOUTH DAILY    Essential hypertension with goal blood pressure less than 140/90       T.E.D. BELOW KNEE/M-REGULAR Misc     1 Package    Wear stockings daily    Left leg  swelling       * Notice:  This list has 4 medication(s) that are the same as other medications prescribed for you. Read the directions carefully, and ask your doctor or other care provider to review them with you.

## 2017-12-19 NOTE — PROGRESS NOTES
LM for the patient to confirm appointment. Will await to hear from patient. Brandi Jimenez RN, BSN     Patient is wondering if able to receive a small wheelchair to get around as walking is becoming more difficult for her. Brandi Jimenez RN, BSN

## 2017-12-19 NOTE — TELEPHONE ENCOUNTER
Received form from pt's daughter    Form from: [facility].  Placed in Providers inbox to review and address.    Will call pt's daughter once this is complete.

## 2017-12-19 NOTE — PROGRESS NOTES
Catheter removal documentation on 12/19/2017:    María Hill presents to the clinic for catheter removal.  Reason for removal: replacement and scheduled insertion  Order has been verified. Released future SF order  Catheter successfully removed at 10:01 AM without immediate complication.  600 cc's of urine present in the catheter bag. Sediment present  Urethral meatus is free of secretions and encrustation.      Catheter insertion documentation on 12/19/2017:    María Hill presents to the clinic for catheter insertion.  Reason for insertion: replacement and scheduled insertion  Order has been verified. Released future SF order  Catheter successfully inserted into the urethral meatus in the usual sterile fashion without immediate complication.  Type of catheter placed: 16 Greenlandic indwelling catheter  Urine is yellow in color. Sediment present  10 cc's of urine output returned.  Balloon was filled with 10cc's of normal saline.  Securement device placed for the catheter.  The patient tolerated the procedure and was instructed to follow up with their PCP or consultant as planned, monitor for catheter dysfunction, monitor for pain or discomfort, return or call for pain, fever, leakage or decreased urine flow and watch for signs of infection    Maile Valverde, RN, BSN

## 2017-12-19 NOTE — TELEPHONE ENCOUNTER
Spoke with pt's daughter.  We cannot locate the form in the chart, requesting a wheelchair. Asked her to call the home health facility to refax this to us to address.

## 2017-12-19 NOTE — Clinical Note
Patient is wondering if able to receive a small wheelchair to get around as walking is becoming more difficult for her.

## 2017-12-19 NOTE — TELEPHONE ENCOUNTER
Patient's daughter called and was given message below. Her mother is no longer receiving OT but the last time her mother came into the clinic she had brought in a piece of paper from her OT and she thinks it had a marie chair evaluation on it daughter states there should be a copy in her chart.    Thank you Nena

## 2017-12-19 NOTE — Clinical Note
Action Needed: Patient is wondering if able to receive a small wheelchair to get around as walking is becoming more difficult for her. See telephone encounter created today. Maile Valverde, RN, BSN

## 2018-01-01 ENCOUNTER — TELEPHONE (OUTPATIENT)
Dept: FAMILY MEDICINE | Facility: CLINIC | Age: 83
End: 2018-01-01

## 2018-01-01 ENCOUNTER — DOCUMENTATION ONLY (OUTPATIENT)
Dept: FAMILY MEDICINE | Facility: OTHER | Age: 83
End: 2018-01-01

## 2018-01-01 ENCOUNTER — ALLIED HEALTH/NURSE VISIT (OUTPATIENT)
Dept: FAMILY MEDICINE | Facility: CLINIC | Age: 83
End: 2018-01-01
Payer: MEDICARE

## 2018-01-01 ENCOUNTER — TRANSFERRED RECORDS (OUTPATIENT)
Dept: HEALTH INFORMATION MANAGEMENT | Facility: CLINIC | Age: 83
End: 2018-01-01

## 2018-01-01 ENCOUNTER — OFFICE VISIT (OUTPATIENT)
Dept: FAMILY MEDICINE | Facility: CLINIC | Age: 83
End: 2018-01-01
Payer: MEDICARE

## 2018-01-01 ENCOUNTER — APPOINTMENT (OUTPATIENT)
Dept: LAB | Facility: CLINIC | Age: 83
End: 2018-01-01
Payer: MEDICARE

## 2018-01-01 VITALS
SYSTOLIC BLOOD PRESSURE: 124 MMHG | TEMPERATURE: 97.3 F | HEART RATE: 63 BPM | DIASTOLIC BLOOD PRESSURE: 86 MMHG | OXYGEN SATURATION: 98 %

## 2018-01-01 VITALS
TEMPERATURE: 98.5 F | OXYGEN SATURATION: 97 % | WEIGHT: 108.7 LBS | RESPIRATION RATE: 14 BRPM | DIASTOLIC BLOOD PRESSURE: 74 MMHG | BODY MASS INDEX: 20.54 KG/M2 | SYSTOLIC BLOOD PRESSURE: 136 MMHG | HEART RATE: 80 BPM

## 2018-01-01 DIAGNOSIS — E11.9 TYPE 2 DIABETES MELLITUS WITHOUT COMPLICATION, WITHOUT LONG-TERM CURRENT USE OF INSULIN (H): Primary | ICD-10-CM

## 2018-01-01 DIAGNOSIS — N39.0 RECURRENT UTI: Primary | ICD-10-CM

## 2018-01-01 DIAGNOSIS — I10 ESSENTIAL HYPERTENSION: ICD-10-CM

## 2018-01-01 DIAGNOSIS — N31.9 NEUROGENIC BLADDER: Primary | ICD-10-CM

## 2018-01-01 DIAGNOSIS — M62.81 MUSCLE WEAKNESS (GENERALIZED): ICD-10-CM

## 2018-01-01 DIAGNOSIS — R53.83 OTHER FATIGUE: ICD-10-CM

## 2018-01-01 DIAGNOSIS — E11.9 TYPE 2 DIABETES MELLITUS WITHOUT COMPLICATION, WITHOUT LONG-TERM CURRENT USE OF INSULIN (H): ICD-10-CM

## 2018-01-01 DIAGNOSIS — E87.5 HYPERKALEMIA: ICD-10-CM

## 2018-01-01 DIAGNOSIS — N31.9 NEUROGENIC BLADDER: ICD-10-CM

## 2018-01-01 DIAGNOSIS — E87.5 HYPERKALEMIA: Primary | ICD-10-CM

## 2018-01-01 DIAGNOSIS — E78.5 HYPERLIPIDEMIA LDL GOAL <100: ICD-10-CM

## 2018-01-01 DIAGNOSIS — N30.00 ACUTE CYSTITIS WITHOUT HEMATURIA: Primary | ICD-10-CM

## 2018-01-01 DIAGNOSIS — T83.511A URINARY TRACT INFECTION ASSOCIATED WITH INDWELLING URETHRAL CATHETER, INITIAL ENCOUNTER (H): Primary | ICD-10-CM

## 2018-01-01 DIAGNOSIS — E11.49 OTHER DIABETIC NEUROLOGICAL COMPLICATION ASSOCIATED WITH TYPE 2 DIABETES MELLITUS (H): ICD-10-CM

## 2018-01-01 DIAGNOSIS — N39.0 URINARY TRACT INFECTION ASSOCIATED WITH INDWELLING URETHRAL CATHETER, INITIAL ENCOUNTER (H): Primary | ICD-10-CM

## 2018-01-01 LAB
ALBUMIN SERPL-MCNC: 3.5 G/DL (ref 3.4–5)
ALBUMIN UR-MCNC: 100 MG/DL
ALP SERPL-CCNC: 64 U/L (ref 40–150)
ALT SERPL W P-5'-P-CCNC: 24 U/L (ref 0–50)
ALT SERPL-CCNC: 12 U/L (ref 13–69)
ANION GAP SERPL CALCULATED.3IONS-SCNC: 5 MMOL/L (ref 3–14)
ANION GAP SERPL CALCULATED.3IONS-SCNC: 6 MMOL/L (ref 3–14)
APPEARANCE UR: CLEAR
AST SERPL W P-5'-P-CCNC: 13 U/L (ref 0–45)
AST SERPL-CCNC: 13 U/L (ref 15–46)
BACTERIA #/AREA URNS HPF: ABNORMAL /HPF
BACTERIA SPEC CULT: ABNORMAL
BACTERIA SPEC CULT: ABNORMAL
BASOPHILS # BLD AUTO: 0 10E9/L (ref 0–0.2)
BASOPHILS NFR BLD AUTO: 0.4 %
BILIRUB SERPL-MCNC: 0.4 MG/DL (ref 0.2–1.3)
BILIRUB UR QL STRIP: NEGATIVE
BUN SERPL-MCNC: 17 MG/DL (ref 7–30)
BUN SERPL-MCNC: 20 MG/DL (ref 7–30)
CALCIUM SERPL-MCNC: 8.7 MG/DL (ref 8.5–10.1)
CALCIUM SERPL-MCNC: 9.4 MG/DL (ref 8.5–10.1)
CHLORIDE SERPL-SCNC: 107 MMOL/L (ref 94–109)
CHLORIDE SERPL-SCNC: 107 MMOL/L (ref 94–109)
CHOLEST SERPL-MCNC: 182 MG/DL
CO2 SERPL-SCNC: 28 MMOL/L (ref 20–32)
CO2 SERPL-SCNC: 29 MMOL/L (ref 20–32)
COLOR UR AUTO: YELLOW
CREAT SERPL-MCNC: 0.57 MG/DL (ref 0.52–1.04)
CREAT SERPL-MCNC: 0.66 MG/DL (ref 0.52–1.04)
CREAT SERPL-MCNC: 0.98 MG/DL (ref 0.7–1.5)
DIFFERENTIAL METHOD BLD: NORMAL
EOSINOPHIL # BLD AUTO: 0.1 10E9/L (ref 0–0.7)
EOSINOPHIL NFR BLD AUTO: 1.2 %
ERYTHROCYTE [DISTWIDTH] IN BLOOD BY AUTOMATED COUNT: 14.4 % (ref 10–15)
GFR SERPL CREATININE-BSD FRML MDRD: 53 ML/MIN/1.73M2
GFR SERPL CREATININE-BSD FRML MDRD: 83 ML/MIN/1.7M2
GFR SERPL CREATININE-BSD FRML MDRD: >90 ML/MIN/1.7M2
GLUCOSE SERPL-MCNC: 111 MG/DL (ref 70–99)
GLUCOSE SERPL-MCNC: 138 MG/DL (ref 74–106)
GLUCOSE SERPL-MCNC: 150 MG/DL (ref 70–99)
GLUCOSE UR STRIP-MCNC: NEGATIVE MG/DL
HBA1C MFR BLD: 6.6 % (ref 0–5.6)
HCT VFR BLD AUTO: 39.4 % (ref 35–47)
HDLC SERPL-MCNC: 71 MG/DL
HGB BLD-MCNC: 12.6 G/DL (ref 11.7–15.7)
HGB UR QL STRIP: ABNORMAL
INR PPP: 1.1 (ref 0.9–1.1)
KETONES UR STRIP-MCNC: NEGATIVE MG/DL
LDLC SERPL CALC-MCNC: 86 MG/DL
LEUKOCYTE ESTERASE UR QL STRIP: ABNORMAL
LYMPHOCYTES # BLD AUTO: 1.3 10E9/L (ref 0.8–5.3)
LYMPHOCYTES NFR BLD AUTO: 26.2 %
Lab: ABNORMAL
MCH RBC QN AUTO: 30.1 PG (ref 26.5–33)
MCHC RBC AUTO-ENTMCNC: 32 G/DL (ref 31.5–36.5)
MCV RBC AUTO: 94 FL (ref 78–100)
MONOCYTES # BLD AUTO: 0.5 10E9/L (ref 0–1.3)
MONOCYTES NFR BLD AUTO: 9.6 %
NEUTROPHILS # BLD AUTO: 3.1 10E9/L (ref 1.6–8.3)
NEUTROPHILS NFR BLD AUTO: 62.6 %
NITRATE UR QL: NEGATIVE
NON-SQ EPI CELLS #/AREA URNS LPF: ABNORMAL /LPF
NONHDLC SERPL-MCNC: 111 MG/DL
PH UR STRIP: 7.5 PH (ref 5–7)
PLATELET # BLD AUTO: 286 10E9/L (ref 150–450)
POTASSIUM SERPL-SCNC: 4 MMOL/L (ref 3.5–5.1)
POTASSIUM SERPL-SCNC: 4.4 MMOL/L (ref 3.4–5.3)
POTASSIUM SERPL-SCNC: 5.6 MMOL/L (ref 3.4–5.3)
PROT SERPL-MCNC: 7 G/DL (ref 6.8–8.8)
RBC # BLD AUTO: 4.19 10E12/L (ref 3.8–5.2)
RBC #/AREA URNS AUTO: ABNORMAL /HPF
SODIUM SERPL-SCNC: 141 MMOL/L (ref 133–144)
SODIUM SERPL-SCNC: 141 MMOL/L (ref 133–144)
SOURCE: ABNORMAL
SP GR UR STRIP: 1.02 (ref 1–1.03)
SPECIMEN SOURCE: ABNORMAL
TRIGL SERPL-MCNC: 123 MG/DL
UROBILINOGEN UR STRIP-ACNC: 0.2 EU/DL (ref 0.2–1)
WBC # BLD AUTO: 5 10E9/L (ref 4–11)
WBC #/AREA URNS AUTO: ABNORMAL /HPF

## 2018-01-01 PROCEDURE — 99207 ZZC NO CHARGE NURSE ONLY: CPT

## 2018-01-01 PROCEDURE — 99207 C FOOT EXAM  NO CHARGE: CPT | Performed by: FAMILY MEDICINE

## 2018-01-01 PROCEDURE — 99214 OFFICE O/P EST MOD 30 MIN: CPT | Mod: 25 | Performed by: FAMILY MEDICINE

## 2018-01-01 PROCEDURE — 36415 COLL VENOUS BLD VENIPUNCTURE: CPT | Performed by: FAMILY MEDICINE

## 2018-01-01 PROCEDURE — 99211 OFF/OP EST MAY X REQ PHY/QHP: CPT | Mod: 25

## 2018-01-01 PROCEDURE — 87186 SC STD MICRODIL/AGAR DIL: CPT | Performed by: FAMILY MEDICINE

## 2018-01-01 PROCEDURE — 80061 LIPID PANEL: CPT | Performed by: FAMILY MEDICINE

## 2018-01-01 PROCEDURE — 51702 INSERT TEMP BLADDER CATH: CPT

## 2018-01-01 PROCEDURE — 99214 OFFICE O/P EST MOD 30 MIN: CPT | Performed by: FAMILY MEDICINE

## 2018-01-01 PROCEDURE — 80048 BASIC METABOLIC PNL TOTAL CA: CPT | Performed by: FAMILY MEDICINE

## 2018-01-01 PROCEDURE — 81001 URINALYSIS AUTO W/SCOPE: CPT | Performed by: FAMILY MEDICINE

## 2018-01-01 PROCEDURE — 87086 URINE CULTURE/COLONY COUNT: CPT | Performed by: FAMILY MEDICINE

## 2018-01-01 PROCEDURE — 87088 URINE BACTERIA CULTURE: CPT | Performed by: FAMILY MEDICINE

## 2018-01-01 PROCEDURE — 51702 INSERT TEMP BLADDER CATH: CPT | Performed by: FAMILY MEDICINE

## 2018-01-01 PROCEDURE — 85025 COMPLETE CBC W/AUTO DIFF WBC: CPT | Performed by: FAMILY MEDICINE

## 2018-01-01 PROCEDURE — 80053 COMPREHEN METABOLIC PANEL: CPT | Performed by: FAMILY MEDICINE

## 2018-01-01 PROCEDURE — 83036 HEMOGLOBIN GLYCOSYLATED A1C: CPT | Performed by: FAMILY MEDICINE

## 2018-01-01 RX ORDER — CIPROFLOXACIN 500 MG/1
500 TABLET, FILM COATED ORAL 2 TIMES DAILY
Qty: 14 TABLET | Refills: 0 | Status: SHIPPED | OUTPATIENT
Start: 2018-01-01 | End: 2018-01-01

## 2018-01-01 RX ORDER — METFORMIN HCL 500 MG
TABLET, EXTENDED RELEASE 24 HR ORAL
Qty: 360 TABLET | Refills: 0 | Status: SHIPPED | OUTPATIENT
Start: 2018-01-01 | End: 2018-01-01

## 2018-01-01 RX ORDER — NITROFURANTOIN 25; 75 MG/1; MG/1
100 CAPSULE ORAL 2 TIMES DAILY
Qty: 14 CAPSULE | Refills: 0 | Status: SHIPPED | OUTPATIENT
Start: 2018-01-01 | End: 2018-01-01

## 2018-01-01 RX ORDER — METFORMIN HCL 500 MG
TABLET, EXTENDED RELEASE 24 HR ORAL
Qty: 120 TABLET | Refills: 0 | Status: SHIPPED | OUTPATIENT
Start: 2018-01-01

## 2018-01-01 ASSESSMENT — PAIN SCALES - GENERAL
PAINLEVEL: NO PAIN (0)
PAINLEVEL: NO PAIN (0)

## 2018-01-19 NOTE — PROGRESS NOTES
SF-Pt had discomfort with cath insertion today. Daughter states she has been waking up at 2AM and confused the last couple of nights. Urine also had a strong odor. The urine that was in the bag when she came in was very cloudy with sediment. After the cath change the urine still looked cloudy yellow.  I tried to collect a urine sample but lab said it was not enough to test. The area was clean and didn't appear to have redness.     Daughter will being a sample today before 3pm    Catheter removal documentation on 1/19/2018:    María Hill presents to the clinic for catheter removal.  Reason for removal: replacement  Order has been verified. yes  Catheter successfully removed at 11:40 AM without immediate complication.  Approximately 250 cc's of urine present in the catheter bag. Urine is cloudy with strong odor.  Urethral meatus is free of secretions and encrustation.  The patient tolerated the procedure and was instructed to monitor for pain or discomfort      Catheter insertion documentation on 1/19/2018:    María Hill presents to the clinic for catheter insertion.  Reason for insertion: replacement  Order has been verified. yes  Catheter successfully inserted into the urethral meatus in the usual sterile fashion with discomfort.  Type of catheter placed: 16 Chilean indwelling catheter  Urine is yellow, slightly cloudy in color.  3 cc's of urine output returned.  Balloon was filled with 10cc's of normal saline.  Securement device placed for the catheter on left leg  The patient tolerated the procedure and was instructed to monitor for pain or discomfort    Maile Valverde

## 2018-01-19 NOTE — MR AVS SNAPSHOT
After Visit Summary   1/19/2018    María Hill    MRN: [id number]           Patient Information     Date Of Birth          2/22/1926        Visit Information        Provider Department      1/19/2018 11:00 AM RG RN [place] [facility]        Today's Diagnoses     Neurogenic bladder           Follow-ups after your visit        Who to contact     If you have questions or need follow up information about today's clinic visit or your schedule please contact [facility] directly at [phone].  Normal or non-critical lab and imaging results will be communicated to you by MyChart, letter or phone within 4 business days after the clinic has received the results. If you do not hear from us within 7 days, please contact the clinic through [employer] or phone. If you have a critical or abnormal lab result, we will notify you by phone as soon as possible.  Submit refill requests through [employer] or call your pharmacy and they will forward the refill request to us. Please allow 3 business days for your refill to be completed.          Additional Information About Your Visit        MyChart Information     [employer] gives you secure access to your electronic health record. If you see a primary care provider, you can also send messages to your care team and make appointments. If you have questions, please call your primary care clinic.  If you do not have a primary care provider, please call [phone] and they will assist you.        Care EveryWhere ID     This is your Care EveryWhere ID. This could be used by other organizations to access your [place] medical records  [id number]         Blood Pressure from Last 3 Encounters:   08/30/17 150/70   07/18/17 118/60   06/06/17 (!) 165/92    Weight from Last 3 Encounters:   08/30/17 115 lb (52.2 kg)   07/18/17 113 lb 11.2 oz (51.6 kg)   04/19/17 117 lb 1.6 oz (53.1 kg)              We Performed the Following     *UA reflex to Microscopic and  Culture ([facility] (except [place] and [place])     INSERT BLADDER CATH, TEMP INDWELL SIMPLE (BELTRE)     INSERT BLADDER CATH, TEMP INDWELL SIMPLE (BELTRE)        Primary Care Provider Office Phone # Fax #    Jessy Adams [phone] [address] [place] [address]        Equal Access to Services     [facility] : Hadii aad ku hadasho Soomaali, waaxda luqadaha, qaybta kaalmada adeegyada, waxay idiin hayaan adeeg kharash la'aan ah. So [facility] [phone].    ATENCIÓN: Si habla español, tiene a johns disposición servicios gratuitos de asistencia lingüística. Llame al [phone].    We comply with applicable federal civil rights laws and [place] laws. We do not discriminate on the basis of race, color, national origin, age, disability, sex, sexual orientation, or gender identity.            Thank you!     Thank you for choosing [facility]  for your care. Our goal is always to provide you with excellent care. Hearing back from our patients is one way we can continue to improve our services. Please take a few minutes to complete the written survey that you may receive in the mail after your visit with us. Thank you!             Your Updated Medication List - Protect others around you: Learn how to safely use, store and throw away your medicines at www.disposemymeds.org.          This list is accurate as of: 1/19/18  2:03 PM.  Always use your most recent med list.                   Brand Name Dispense Instructions for use Diagnosis    acetaminophen 325 MG tablet    TYLENOL          aspirin 81 MG tablet      Take 81 mg by mouth daily        blood glucose monitoring lancets     100 Box    1 each by In Vitro route 3 times daily Use to test blood sugar 3 times daily or as directed.    Type 2 diabetes mellitus with hyperglycemia (H)       blood glucose monitoring meter device kit     1 kit    USE TO CHECK GLUCOSE THREE TIMES DAILY OR AS DIRECTED    Diabetes  mellitus, type 2 (H)       blood glucose monitoring test strip    ONETOUCH ULTRA    100 each    TEST 3 TIMES DAILY OR AS DIRECTED    Type 2 diabetes mellitus with hyperglycemia (H)       * ciprofloxacin 250 MG tablet    CIPRO          * ciprofloxacin 500 MG tablet    CIPRO    20 tablet    Take 1 tablet (500 mg) by mouth 2 times daily    Urinary tract infection associated with indwelling urethral catheter, subsequent encounter       COQ-10 PO           CRANBERRY CONCENTRATE 500 MG Caps   Generic drug:  Cranberry     60 capsule    TAKE 1 CAPSULE BY MOUTH DAILY    Health care maintenance       GLUCOSAMINE CHONDR COMPLEX PO      Reported on 4/19/2017        Lactobacillus Acidophilus Powd      Take 1 Packet by mouth twice daily with food.        metFORMIN 500 MG 24 hr tablet    GLUCOPHAGE-XR    360 tablet    TAKE 2 TABLETS BY MOUTH 2 TIMES DAILY WITH MEALS    Type 2 diabetes mellitus with hyperglycemia (H)       OCUVITE PRESERVISION Tabs           * order for DME     1 Units    Equipment being ordered: Wheelchair    DDD (degenerative disc disease), lumbar       * order for DME     2 each    Equipment being ordered: catheter secure strap, please fill the elastic strap with the green velcroe closure.    Urinary retention with incomplete bladder emptying       order for DME     3 each    Equipment being ordered: supplies for catheterization. Leg bag replacements and attachments. Dispense per insurance allowance.    Neurogenic bladder       QUEtiapine 25 MG tablet    SEROquel     Take 25 mg by mouth once daily at bedtime. May take additional 25 mg three times daily as needed for agitation, hallucinations        ramipril 10 MG capsule    ALTACE    90 capsule    TAKE 1 CAPSULE BY MOUTH DAILY    Essential hypertension with goal blood pressure less than 140/90       T.E.D. BELOW KNEE/M-REGULAR Misc     1 Package    Wear stockings daily    Left leg swelling       * Notice:  This list has 4 medication(s) that are the same as other  medications prescribed for you. Read the directions carefully, and ask your doctor or other care provider to review them with you.

## 2018-01-23 NOTE — TELEPHONE ENCOUNTER
Culture has grown out two bacteria.     The cipro that she has been on should take care of one.  Finish that course.     Will add nitrofurantoin to take for the next week for the other.

## 2018-02-19 NOTE — Clinical Note
Pt had catheter changed today with no signs or symptoms of infection. She also needs more orders placed for future catheter changes please. Maile Valverde, RN, BSN

## 2018-02-19 NOTE — PROGRESS NOTES
Catheter removal documentation on 2/19/2018:    María Hill presents to the clinic for catheter removal.  Reason for removal: replacement  Order has been verified. Yes  Catheter successfully removed at 9:28 AM without immediate complication.  100 cc's of urine present in the catheter bag.  Urethral meatus is free of secretions and encrustation.  The patient is afebrile.  The patient tolerated the procedure      Catheter insertion documentation on 2/19/2018:    María Hill presents to the clinic for catheter insertion.  Reason for insertion: replacement  Order has been verified. Yes  Catheter successfully inserted into the urethral meatus in the usual sterile fashion without immediate complication.  Type of catheter placed: 16 Kazakh indwelling catheter  Urine is yellow in color.  5 cc's of urine output returned.  Balloon was filled with 10cc's of normal saline.  Securement device placed for the catheter.  The patient tolerated the procedure and was instructed to monitor for catheter dysfunction, monitor for pain or discomfort, return or call for pain, fever, leakage or decreased urine flow and watch for signs of infection    Maile Valverde

## 2018-02-19 NOTE — MR AVS SNAPSHOT
After Visit Summary   2/19/2018    María Hill    MRN: [id number]           Patient Information     Date Of Birth          2/22/1926        Visit Information        Provider Department      2/19/2018 9:00 AM RG RN [facility]        Today's Diagnoses     Neurogenic bladder           Follow-ups after your visit        Follow-up notes from your care team     Return in about 4 weeks (around 3/19/2018).      Who to contact     If you have questions or need follow up information about today's clinic visit or your schedule please contact [facility] [place] [facility] [place] directly at [phone].  Normal or non-critical lab and imaging results will be communicated to you by SideSte[employer], letter or phone within 4 business days after the clinic has received the results. If you do not hear from us within 7 days, please contact the clinic through [employer] or phone. If you have a critical or abnormal lab result, we will notify you by phone as soon as possible.  Submit refill requests through [employer] or call your pharmacy and they will forward the refill request to us. Please allow 3 business days for your refill to be completed.          Additional Information About Your Visit        MyChart Information     [employer] gives you secure access to your electronic health record. If you see a primary care provider, you can also send messages to your care team and make appointments. If you have questions, please call your primary care clinic.  If you do not have a primary care provider, please call [phone] and they will assist you.        Care EveryWhere ID     This is your Care EveryWhere ID. This could be used by other organizations to access your [facility] medical records  [id number]         Blood Pressure from Last 3 Encounters:   08/30/17 150/70   07/18/17 118/60   06/06/17 (!) 165/92    Weight from Last 3 Encounters:   08/30/17 115 lb (52.2 kg)   07/18/17 113 lb 11.2 oz (51.6 kg)   04/19/17 117 lb 1.6 oz  (53.1 kg)              We Performed the Following     INSERT BLADDER CATH, TEMP INDWELL SIMPLE (BELTRE)        Primary Care Provider Office Phone # Fax #    Jessy Adams [phone] [address]  [place] [address]        Equal Access to Services     [facility] GRADY : Hadii aad ku hadasho Soomaali, waaxda luqadaha, qaybta kaalmada adeegyada, waxay idiin hayaan adeeg kharash la'leon . So [facility] [phone].    ATENCIÓN: Si habla español, tiene a johns disposición servicios gratuitos de asistencia lingüística. Llame al [phone].    We comply with applicable federal civil rights laws and [place] laws. We do not discriminate on the basis of race, color, national origin, age, disability, sex, sexual orientation, or gender identity.            Thank you!     Thank you for choosing [facility]  for your care. Our goal is always to provide you with excellent care. Hearing back from our patients is one way we can continue to improve our services. Please take a few minutes to complete the written survey that you may receive in the mail after your visit with us. Thank you!             Your Updated Medication List - Protect others around you: Learn how to safely use, store and throw away your medicines at www.disposemymeds.org.          This list is accurate as of 2/19/18  9:31 AM.  Always use your most recent med list.                   Brand Name Dispense Instructions for use Diagnosis    acetaminophen 325 MG tablet    TYLENOL          aspirin 81 MG tablet      Take 81 mg by mouth daily        blood glucose monitoring lancets     100 Box    1 each by In Vitro route 3 times daily Use to test blood sugar 3 times daily or as directed.    Type 2 diabetes mellitus with hyperglycemia (H)       blood glucose monitoring meter device kit     1 kit    USE TO CHECK GLUCOSE THREE TIMES DAILY OR AS DIRECTED    Diabetes mellitus, type 2 (H)       blood glucose monitoring test strip    ONETOUCH  ULTRA    100 each    TEST 3 TIMES DAILY OR AS DIRECTED    Type 2 diabetes mellitus with hyperglycemia (H)       * ciprofloxacin 250 MG tablet    CIPRO          * ciprofloxacin 500 MG tablet    CIPRO    20 tablet    Take 1 tablet (500 mg) by mouth 2 times daily    Urinary tract infection associated with indwelling urethral catheter, subsequent encounter       * ciprofloxacin 500 MG tablet    CIPRO    14 tablet    Take 1 tablet (500 mg) by mouth 2 times daily    Urinary tract infection associated with indwelling urethral catheter, initial encounter (H)       COQ-10 PO           CRANBERRY CONCENTRATE 500 MG Caps   Generic drug:  Cranberry     60 capsule    TAKE 1 CAPSULE BY MOUTH DAILY    Health care maintenance       GLUCOSAMINE CHONDR COMPLEX PO      Reported on 4/19/2017        Lactobacillus Acidophilus Powd      Take 1 Packet by mouth twice daily with food.        metFORMIN 500 MG 24 hr tablet    GLUCOPHAGE-XR    360 tablet    TAKE 2 TABLETS BY MOUTH 2 TIMES DAILY WITH MEALS    Type 2 diabetes mellitus with hyperglycemia (H)       nitroFURantoin (macrocrystal-monohydrate) 100 MG capsule    MACROBID    14 capsule    Take 1 capsule (100 mg) by mouth 2 times daily    Acute cystitis without hematuria       OCUVITE PRESERVISION Tabs           * order for DME     1 Units    Equipment being ordered: Wheelchair    DDD (degenerative disc disease), lumbar       * order for DME     2 each    Equipment being ordered: catheter secure strap, please fill the elastic strap with the green velcroe closure.    Urinary retention with incomplete bladder emptying       order for DME     3 each    Equipment being ordered: supplies for catheterization. Leg bag replacements and attachments. Dispense per insurance allowance.    Neurogenic bladder       QUEtiapine 25 MG tablet    SEROquel     Take 25 mg by mouth once daily at bedtime. May take additional 25 mg three times daily as needed for agitation, hallucinations        ramipril 10 MG  capsule    ALTACE    90 capsule    TAKE 1 CAPSULE BY MOUTH DAILY    Essential hypertension with goal blood pressure less than 140/90       T.E.D. BELOW KNEE/M-REGULAR Misc     1 Package    Wear stockings daily    Left leg swelling       * Notice:  This list has 5 medication(s) that are the same as other medications prescribed for you. Read the directions carefully, and ask your doctor or other care provider to review them with you.

## 2018-03-23 NOTE — PROGRESS NOTES
Catheter removal documentation on 3/23/2018:    María Hill presents to the clinic for catheter removal.  Reason for removal: replacement  Order has been verified. VO from  ok to replace catheter  Catheter successfully removed at 11:59 AM without immediate complication.  500 cc's of urine present in the catheter bag.  Urethral meatus is free of secretions and encrustation.  The patient is afebrile.    Catheter insertion documentation on 3/23/2018:    María Hill presents to the clinic for catheter insertion.  Reason for insertion: replacement  Catheter successfully inserted into the urethral meatus in the usual sterile fashion without immediate complication.  Type of catheter placed: 16 Luxembourgish indwelling catheter  Urine is yellow in color.  10 cc's of urine output returned.  Balloon was filled with 10cc's of normal saline.  Securement device placed for the catheter.  The patient tolerated the procedure and was instructed to monitor for catheter dysfunction, monitor for pain or discomfort, return or call for pain, fever, leakage or decreased urine flow and watch for signs of infection    Maile Valverde

## 2018-03-23 NOTE — MR AVS SNAPSHOT
After Visit Summary   3/23/2018    María Hill    MRN: [id number]           Patient Information     Date Of Birth          2/22/1926        Visit Information        Provider Department      3/23/2018 10:30 AM RG RN [facility]        Today's Diagnoses     Neurogenic bladder    -  1       Follow-ups after your visit        Your next 10 appointments already scheduled     Apr 25, 2018 10:00 AM CDT   Nurse Only with RG RN   [facility] ([facility]    [address], [address]  [place] [id number] [phone]              Future tests that were ordered for you today     Open Standing Orders        Priority Remaining Interval Expires Ordered    INSERT BLADDER CATH, TEMP INDWELL SIMPLE (BELTRE) Routine 10/10  3/23/2019 3/23/2018            Who to contact     If you have questions or need follow up information about today's clinic visit or your schedule please contact [facility] [place] directly at [phone].  Normal or non-critical lab and imaging results will be communicated to you by [employer], letter or phone within 4 business days after the clinic has received the results. If you do not hear from us within 7 days, please contact the clinic through [employer] or phone. If you have a critical or abnormal lab result, we will notify you by phone as soon as possible.  Submit refill requests through [employer] or call your pharmacy and they will forward the refill request to us. Please allow 3 business days for your refill to be completed.          Additional Information About Your Visit        [employer] Information     [employer] gives you secure access to your electronic health record. If you see a primary care provider, you can also send messages to your care team and make appointments. If you have questions, please call your primary care clinic.  If you do not have a primary care provider, please call [phone] and they will assist you.        Care  EveryWhere ID     This is your Care EveryWhere ID. This could be used by other organizations to access your [facility] medical records  [id number]         Blood Pressure from Last 3 Encounters:   08/30/17 150/70   07/18/17 118/60   06/06/17 (!) 165/92    Weight from Last 3 Encounters:   08/30/17 115 lb (52.2 kg)   07/18/17 113 lb 11.2 oz (51.6 kg)   04/19/17 117 lb 1.6 oz (53.1 kg)               Primary Care Provider Office Phone # Fax #    Jessy SOLANO MD Angie [phone] [address] [address]        Equal Access to Services     JENNY RASMUSSEN : Hadii aad ku hadasho Soomaali, waaxda luqadaha, qaybta kaalmada adeegyada, waxsalty cam . So [facility] [phone].    ATENCIÓN: Si habla español, tiene a johns disposición servicios gratuitos de asistencia lingüística. [facility] [phone].    We comply with applicable federal civil rights laws and [place] laws. We do not discriminate on the basis of race, color, national origin, age, disability, sex, sexual orientation, or gender identity.            Thank you!     Thank you for choosing [facility]  for your care. Our goal is always to provide you with excellent care. Hearing back from our patients is one way we can continue to improve our services. Please take a few minutes to complete the written survey that you may receive in the mail after your visit with us. Thank you!             Your Updated Medication List - Protect others around you: Learn how to safely use, store and throw away your medicines at www.disposemymeds.org.          This list is accurate as of 3/23/18 12:11 PM.  Always use your most recent med list.                   Brand Name Dispense Instructions for use Diagnosis    acetaminophen 325 MG tablet    TYLENOL          aspirin 81 MG tablet      Take 81 mg by mouth daily        blood glucose monitoring lancets     100 Box    1 each by In Vitro route 3 times daily Use to test blood  sugar 3 times daily or as directed.    Type 2 diabetes mellitus with hyperglycemia (H)       blood glucose monitoring meter device kit     1 kit    USE TO CHECK GLUCOSE THREE TIMES DAILY OR AS DIRECTED    Diabetes mellitus, type 2 (H)       blood glucose monitoring test strip    ONETOUCH ULTRA    100 each    TEST 3 TIMES DAILY OR AS DIRECTED    Type 2 diabetes mellitus with hyperglycemia (H)       * ciprofloxacin 250 MG tablet    CIPRO          * ciprofloxacin 500 MG tablet    CIPRO    20 tablet    Take 1 tablet (500 mg) by mouth 2 times daily    Urinary tract infection associated with indwelling urethral catheter, subsequent encounter       * ciprofloxacin 500 MG tablet    CIPRO    14 tablet    Take 1 tablet (500 mg) by mouth 2 times daily    Urinary tract infection associated with indwelling urethral catheter, initial encounter (H)       COQ-10 PO           CRANBERRY CONCENTRATE 500 MG Caps   Generic drug:  Cranberry     60 capsule    TAKE 1 CAPSULE BY MOUTH DAILY    Health care maintenance       GLUCOSAMINE CHONDR COMPLEX PO      Reported on 4/19/2017        Lactobacillus Acidophilus Powd      Take 1 Packet by mouth twice daily with food.        metFORMIN 500 MG 24 hr tablet    GLUCOPHAGE-XR    360 tablet    TAKE 2 TABLETS BY MOUTH 2 TIMES DAILY WITH MEALS    Type 2 diabetes mellitus with hyperglycemia (H)       nitroFURantoin (macrocrystal-monohydrate) 100 MG capsule    MACROBID    14 capsule    Take 1 capsule (100 mg) by mouth 2 times daily    Acute cystitis without hematuria       OCUVITE PRESERVISION Tabs           * order for DME     1 Units    Equipment being ordered: Wheelchair    DDD (degenerative disc disease), lumbar       * order for DME     2 each    Equipment being ordered: catheter secure strap, please fill the elastic strap with the green velcroe closure.    Urinary retention with incomplete bladder emptying       order for DME     3 each    Equipment being ordered: supplies for catheterization.  Leg bag replacements and attachments. Dispense per insurance allowance.    Neurogenic bladder       QUEtiapine 25 MG tablet    SEROquel     Take 25 mg by mouth once daily at bedtime. May take additional 25 mg three times daily as needed for agitation, hallucinations        ramipril 10 MG capsule    ALTACE    90 capsule    TAKE 1 CAPSULE BY MOUTH DAILY    Essential hypertension with goal blood pressure less than 140/90       T.E.D. BELOW KNEE/M-REGULAR Misc     1 Package    Wear stockings daily    Left leg swelling       * Notice:  This list has 5 medication(s) that are the same as other medications prescribed for you. Read the directions carefully, and ask your doctor or other care provider to review them with you.

## 2018-03-27 PROBLEM — E11.9 TYPE 2 DIABETES MELLITUS WITHOUT COMPLICATION, WITHOUT LONG-TERM CURRENT USE OF INSULIN (H): Status: ACTIVE | Noted: 2018-01-01

## 2018-03-27 NOTE — TELEPHONE ENCOUNTER
Called, patient's daughter was not home. Please tried to call again with the following message below.

## 2018-03-27 NOTE — TELEPHONE ENCOUNTER
Metformin:  Routing refill request to provider for review/approval because:  Appears to be a break in medication - should have been out around 11/11/17    Next 5 appointments (look out 90 days)     Apr 25, 2018 10:00 AM CDT   Nurse Only with RG RN   [facility] ([facility]    [address], [address]  [place] [address]   [phone]                Kimmie Michelle, RN, BSN

## 2018-03-27 NOTE — TELEPHONE ENCOUNTER
Refill sent for one time only--last diabetic lab work was in July 2017.    Needing to schedule a clinic visit with Dr. Adams in the near future and also needing fasting blood studies.    Assist in scheduling as needed.    Rodolfo Keenan MD  Please close encounter when call/work completed.

## 2018-03-30 NOTE — PROGRESS NOTES
"  SUBJECTIVE:   María Hill is a 89 year old female who presents to clinic today for the following health issues:    María has extensive hearing loss, and has difficulty understanding. Her daughter accompanies her to provide a history. Her daughter is her primary caregiver.     Diabetes     Diabetes:     Frequency of checking blood sugars::  Not at all    Diabetic concerns::  None    Hypoglycemia symptoms::  None    Paraesthesia present::  No (they are red )    Eye Exam in the last year::  NO    Hyperlipidemia:     Low fat/chol diet rating::  Not monitoring fat    Taking Statins::  No    Side effects from hypolipidemia medication::  No muscle aches from Statin    Lipid Medications or Supplements::  None  Hyperlipidemia     Diabetes:     Frequency of checking blood sugars::  Not at all    Diabetic concerns::  None    Hypoglycemia symptoms::  None    Paraesthesia present::  No (they are red )    Eye Exam in the last year::  NO    Hyperlipidemia:     Low fat/chol diet rating::  Not monitoring fat    Taking Statins::  No    Side effects from hypolipidemia medication::  No muscle aches from Statin    Lipid Medications or Supplements::  None    She \"seems to feel okay\".    Incontinence Concern  Her daughter reports that she \"isn't necessarily having diarrhea\", but she has been having some incontinence and hasn't been able to tell when she is going to have a bowel movement. Once or twice a day she soils herself and has to be cleaned. She has been on Metformin for a while. Has never been constipated in the last 6 years that her daughter can think of. She is having no problems eating, has a good appetite. Her daughter thinks she has lost some weight and her arms are looking thinner. Her daughter has started preemptively taking her to the bathroom an hour after eating as that seems like when it happens.     Fatigue  Her daughter reports that she is sleeping more often and taking naps throughout the day. She " "didn't take naps before, so she is worried something could be wrong.     Mobility  She uses a wheelchair at home, and a walker to get to the car. She can take maybe 2 steps before she has to sit down somewhere. Her daughter tries to keep her from having falls by watching her closely. She can get up from sitting, but not move any great distance, needs help to stand up.     Memory Concern  She is personally concerned that she may have dementia. She has some difficulty remembering parts of conversations, paying attention during conversations, and with poor focus. She is less interactive today than in the past. She repeated states that she \"doesn't know where [she's] going\". She wanders in her chair while at home.       Problem list and histories reviewed & adjusted, as indicated.  Additional history: as documented    Hemoglobin A1C   Date Value Ref Range Status   07/18/2017 6.7 (H) 4.3 - 6.0 % Final   12/07/2016 7.0 (H) 4.3 - 6.0 % Final   06/09/2016 7.0 (H) 4.3 - 6.0 % Final   12/09/2015 7.8 (H) 4.3 - 6.0 % Final     Wt Readings from Last 4 Encounters:   08/30/17 52.2 kg (115 lb)   07/18/17 51.6 kg (113 lb 11.2 oz)   04/19/17 53.1 kg (117 lb 1.6 oz)   02/01/17 51.8 kg (114 lb 3.2 oz)     BP Readings from Last 3 Encounters:   04/24/18 136/74   08/30/17 150/70   07/18/17 118/60    Wt Readings from Last 3 Encounters:   08/30/17 52.2 kg (115 lb)   07/18/17 51.6 kg (113 lb 11.2 oz)   04/19/17 53.1 kg (117 lb 1.6 oz)           ROS:  CONSTITUTIONAL: NEGATIVE for fever, chills; POSITIVE for weight loss  ENT/MOUTH: NEGATIVE for mouth and throat problems; POSITIVE for hearing loss  RESP: NEGATIVE for significant cough or SOB  CV: NEGATIVE for chest pain, palpitations or peripheral edema  GI: POSITIVE for loose stools, incontinence  MS: NEGATIVE for significant arthralgias or myalgias  NEURO: POSITIVE for memory changes    This document serves as a record of the services and decisions personally performed and made by Jessy" MD Angie. It was created on her behalf by Dorothy Pa, a trained medical scribe. The creation of this document is based the provider's statements to the medical scribe.  Dorothy Pa, April 24, 2018 10:51 AM     OBJECTIVE:     /74  Pulse 80  Temp 98.5  F (36.9  C) (Temporal)  Resp 14  SpO2 97%  There is no height or weight on file to calculate BMI.  GENERAL: healthy, alert and no distress  EYES: Eyes grossly normal to inspection, PERRL and conjunctivae and sclerae normal  HENT: ear canals and TM's normal, nose and mouth without ulcers or lesions  NECK: no adenopathy, no asymmetry, masses, or scars and thyroid normal to palpation  RESP: lungs clear to auscultation - no rales, rhonchi or wheezes  CV: regular rate and rhythm, normal S1 S2, no S3 or S4, no murmur, click or rub, no peripheral edema and peripheral pulses strong  ABDOMEN: soft, nontender, no hepatosplenomegaly, no masses and bowel sounds normal  MS: no gross musculoskeletal defects noted, no edema  SKIN: no suspicious lesions or rashes to visible skin  NEURO: Normal strength and tone, sensory exam grossly normal, mentation intact, speech normal  PSYCH: mentation appears normal., affect normal, appears confused, hearing poor, tangential    FOOT:  Circulation - trace pitting edema bilaterally, Skin - Mild erythema on top of both feet, but no warmth or tenderness, no other lesions noted and ROM - Normal    Diagnostic Test Results:  No results found for this or any previous visit (from the past 24 hour(s)).    ASSESSMENT/PLAN:           1. Type 2 diabetes mellitus without complication, without long-term current use of insulin (H)  Due for diabetic foot exam and labs. Daughter would like to keep her on the metformin for her blood sugar control, even though she is having the looser stools.   Awaiting results. Dose adjustment as needed.    - FOOT EXAM  NO CHARGE [[id number]]  - COMPREHENSIVE METABOLIC PANEL  - HEMOGLOBIN A1C    2. Other diabetic  neurological complication associated with type 2 diabetes mellitus (H)  History of neuropathy. No concerns today. See above.     3. Hyperlipidemia LDL goal <100  Patient due for annual cholesterol screening. Patient will be informed of the results of this test.   - Lipid panel reflex to direct LDL Fasting    4. Essential hypertension  Pt doing well on current medication and treatment plan. No change at this time. Will check CMP due to long term use of medications.   - COMPREHENSIVE METABOLIC PANEL    5. Other fatigue  Will check electrolyte levels with CMP and CBC to check for anemia, or other deficiencies that could be causing her increasing fatigue.   Discussed with daughter her safety at home. Daughter feels capable of caring for her mother at this time.   Has had home care but that is currently done.   - COMPREHENSIVE METABOLIC PANEL  - CBC with platelets and differential    Schultz Catheter: Can have her catheter changed today so she doesn't have to return tomorrow.     All questions invited, asked and answered to the patient's apparent satisfaction.  Patient agrees to plan.     See Patient Instructions    The information in this document, created by the medical scribe for me, accurately reflects the services I personally performed and the decisions made by me. I have reviewed and approved this document for accuracy.     MAURICE WOODS MD, MD  [facility]

## 2018-04-24 NOTE — PROCEDURES
Catheter removal documentation on 4/24/2018:    María Hill presents to the clinic for catheter removal.  Reason for removal: replacement  Order has been verified. Yes  Catheter successfully removed at 12:25 PM without immediate complication.  100 cc's of urine present in the catheter bag.  Urethral meatus is free of secretions and encrustation.  The patient is afebrile.  The patient tolerated the procedure and was instructed to monitor for catheter dysfunction, monitor for pain or discomfort, return or call for pain, fever, leakage or decreased urine flow and watch for signs of infection      Catheter insertion documentation on 4/24/2018:    María Hill presents to the clinic for catheter insertion.  Reason for insertion: replacement  Order has been verified. Yes  Catheter successfully inserted into the urethral meatus in the usual sterile fashion without immediate complication.  Type of catheter placed: 16 Gabonese indwelling catheter  Urine is yellow in color.  5 cc's of urine output returned.  Balloon was filled with 10cc's of normal saline.  Securement device placed for the catheter.  The patient tolerated the procedure and was instructed to monitor for catheter dysfunction, monitor for pain or discomfort, return or call for pain, fever, leakage or decreased urine flow and watch for signs of infection    Pt did have some discomfort with insertion but resolved shortly after insertion. Easy to insert and urine present instantly.    Maile Valverde, RN, BSN

## 2018-04-24 NOTE — MR AVS SNAPSHOT
After Visit Summary   4/24/2018    María Hill    MRN: [id number]           Patient Information     Date Of Birth          2/22/1926        Visit Information        Provider Department      4/24/2018 10:20 AM Jessy Adams MD [facility]        Today's Diagnoses     Type 2 diabetes mellitus without complication, without long-term current use of insulin (H)    -  1    Other diabetic neurological complication associated with type 2 diabetes mellitus (H)        Hyperlipidemia LDL goal <100        Essential hypertension        Other fatigue           Follow-ups after your visit        Follow-up notes from your care team     Return in about 6 months (around 10/24/2018) for Routine Visit.      Your next 10 appointments already scheduled     Apr 25, 2018 10:00 AM CDT   Nurse Only with RG RN   [facility] [place] ([facility])    [address], [address]  [facility] [address] [phone]              Who to contact     If you have questions or need follow up information about today's clinic visit or your schedule please contact [facility] directly at [phone].  Normal or non-critical lab and imaging results will be communicated to you by [employer], letter or phone within 4 business days after the clinic has received the results. If you do not hear from us within 7 days, please contact the clinic through [employer] or phone. If you have a critical or abnormal lab result, we will notify you by phone as soon as possible.  Submit refill requests through [employer] or call your pharmacy and they will forward the refill request to us. Please allow 3 business days for your refill to be completed.          Additional Information About Your Visit        MyChart Information     [employer] gives you secure access to your electronic health record. If you see a primary care provider, you can also send messages to your care team and make appointments. If you have  questions, please call your primary care clinic.  If you do not have a primary care provider, please call [phone] and they will assist you.        Care EveryWhere ID     This is your Care EveryWhere ID. This could be used by other organizations to access your [place] medical records  [id number]        Your Vitals Were     Pulse Temperature Respirations Pulse Oximetry          80 98.5  F (36.9  C) (Temporal) 14 97%         Blood Pressure from Last 3 Encounters:   04/24/18 136/74   08/30/17 150/70   07/18/17 118/60    Weight from Last 3 Encounters:   08/30/17 115 lb (52.2 kg)   07/18/17 113 lb 11.2 oz (51.6 kg)   04/19/17 117 lb 1.6 oz (53.1 kg)              We Performed the Following     CBC with platelets and differential     COMPREHENSIVE METABOLIC PANEL     FOOT EXAM  NO CHARGE [48559.114]     HEMOGLOBIN A1C     Lipid panel reflex to direct LDL Fasting          Today's Medication Changes          These changes are accurate as of 4/24/18 11:08 AM.  If you have any questions, ask your nurse or doctor.               These medicines have changed or have updated prescriptions.        Dose/Directions    ciprofloxacin 250 MG tablet   Commonly known as:  CIPRO   This may have changed:  Another medication with the same name was removed. Continue taking this medication, and follow the directions you see here.   Changed by:  Jessy Adams MD        Refills:  0                Primary Care Provider Office Phone # Fax #    Jessy Adams [phone] [address] [address]        Equal Access to Services     [facility] HospitalSAMIRA : Hadii aad ku hadasho Soomaali, waaxda luqadaha, qaybta kaalmada gerardoegyada, silvina cam . So [facility] [phone].    ATENCIÓN: Si habla español, tiene a johns disposición servicios gratuitos de asistencia lingüística. Llame al [phone].    We comply with applicable federal civil rights laws and [place] laws. We do not  discriminate on the basis of race, color, national origin, age, disability, sex, sexual orientation, or gender identity.            Thank you!     Thank you for choosing [facility]  for your care. Our goal is always to provide you with excellent care. Hearing back from our patients is one way we can continue to improve our services. Please take a few minutes to complete the written survey that you may receive in the mail after your visit with us. Thank you!             Your Updated Medication List - Protect others around you: Learn how to safely use, store and throw away your medicines at www.disposemymeds.org.          This list is accurate as of 4/24/18 11:08 AM.  Always use your most recent med list.                   Brand Name Dispense Instructions for use Diagnosis    acetaminophen 325 MG tablet    TYLENOL          aspirin 81 MG tablet      Take 81 mg by mouth daily        blood glucose monitoring lancets     100 Box    1 each by In Vitro route 3 times daily Use to test blood sugar 3 times daily or as directed.    Type 2 diabetes mellitus with hyperglycemia (H)       blood glucose monitoring meter device kit     1 kit    USE TO CHECK GLUCOSE THREE TIMES DAILY OR AS DIRECTED    Diabetes mellitus, type 2 (H)       blood glucose monitoring test strip    ONETOUCH ULTRA    100 each    TEST 3 TIMES DAILY OR AS DIRECTED    Type 2 diabetes mellitus with hyperglycemia (H)       ciprofloxacin 250 MG tablet    CIPRO          COQ-10 PO           CRANBERRY CONCENTRATE 500 MG Caps   Generic drug:  Cranberry     60 capsule    TAKE 1 CAPSULE BY MOUTH DAILY    Health care maintenance       GLUCOSAMINE CHONDR COMPLEX PO      Reported on 4/19/2017        Lactobacillus Acidophilus Powd      Take 1 Packet by mouth twice daily with food.        metFORMIN 500 MG 24 hr tablet    GLUCOPHAGE-XR    360 tablet    TAKE 2 TABLETS BY MOUTH 2 TIMES DAILY WITH MEALS    Type 2 diabetes mellitus without complication, without  long-term current use of insulin (H)       nitroFURantoin (macrocrystal-monohydrate) 100 MG capsule    MACROBID    14 capsule    Take 1 capsule (100 mg) by mouth 2 times daily    Acute cystitis without hematuria       OCUVITE PRESERVISION Tabs           * order for DME     1 Units    Equipment being ordered: Wheelchair    DDD (degenerative disc disease), lumbar       * order for DME     2 each    Equipment being ordered: catheter secure strap, please fill the elastic strap with the green velcroe closure.    Urinary retention with incomplete bladder emptying       order for DME     3 each    Equipment being ordered: supplies for catheterization. Leg bag replacements and attachments. Dispense per insurance allowance.    Neurogenic bladder       QUEtiapine 25 MG tablet    SEROquel     Take 25 mg by mouth once daily at bedtime. May take additional 25 mg three times daily as needed for agitation, hallucinations        ramipril 10 MG capsule    ALTACE    90 capsule    TAKE 1 CAPSULE BY MOUTH DAILY    Essential hypertension with goal blood pressure less than 140/90       T.E.D. BELOW KNEE/M-REGULAR Misc     1 Package    Wear stockings daily    Left leg swelling       * Notice:  This list has 2 medication(s) that are the same as other medications prescribed for you. Read the directions carefully, and ask your doctor or other care provider to review them with you.

## 2018-05-01 NOTE — TELEPHONE ENCOUNTER
Wrong number for [employer]- this is for medication coverage, not equipment.  Tried calling  MEDICARE Ph: [phone].  Unable to speak to actual human at Medicare.    Called Miladis - asked her to call back.    Please inform her I had no luck getting a hold of insurance.  I will need her to call and request the pre-authorization form on behalf of patient.  She can then fax it to us or bring it to the clinic.

## 2018-05-01 NOTE — TELEPHONE ENCOUNTER
Patient dropped off hand written note at the  re: pre authorization needed from Doctor - same guidelines as medicare benefits - source of equipment: hospital ([facility]) or [facility].      Left message asking patient to return call.  please clarify:  1. What are they asking for?  Pre Auth for what equipment?  2. What are the reasons they need it?  What have they tried/failed in the past?  3. What is the phone number and fax number to their insurance?  4. Do they have the pre-auth form for us to complete?    Feel free to transfer call to [place].

## 2018-05-01 NOTE — TELEPHONE ENCOUNTER
Please notify of results.     Potassium level is a little high but not clear as to why.  This should be repeated this week. Orders placed. Can be a lab appointment.     Liver function and kidney function are normal.     Cholesterol panel looks great.     A1C is really good at 6.6. No change in medication needed.     Your white count is normal. Normal hemoglobin - no anemia.  No evidence of low iron. Normal platelets.      Jessy Adams MD

## 2018-05-01 NOTE — TELEPHONE ENCOUNTER
Spoke to omero, patient's daughter.    Need a light weight wheelchair  [facility] # [phone]

## 2018-05-03 NOTE — TELEPHONE ENCOUNTER
Spoke to Mliadis. Asked her to reach out to Medicare to get the pre auth form that needs to be completed and bring it to the clinic.

## 2018-06-01 NOTE — PROGRESS NOTES
Catheter removal documentation on 6/1/2018:    María Hill presents to the clinic for catheter removal.  Reason for removal: replacement  Order has been verified. SF, order released   Catheter successfully removed at 9:00 AM without immediate complication.  300 cc's of urine present in the catheter bag.  Urethral meatus is free of secretions and encrustation.  The patient is afebrile.    Catheter insertion documentation on 6/1/2018:    María Hill presents to the clinic for catheter insertion.  Reason for insertion: replacement  Order has been verified. SF order released   Catheter successfully inserted into the urethral meatus in the usual sterile fashion without immediate complication.  Type of catheter placed: 16 Malay indwelling catheter  Urine is yellow in color.  700 cc's of urine output returned.  Balloon was filled with 10cc's of normal saline.  Securement device placed for the catheter.  The patient tolerated the procedure and was instructed to follow up with their PCP or consultant as planned, monitor for catheter dysfunction, monitor for pain or discomfort, return or call for pain, fever, leakage or decreased urine flow and watch for signs of infection    Brandi Jimenez RN, BSN

## 2018-06-01 NOTE — MR AVS SNAPSHOT
After Visit Summary   6/1/2018    María Hill    MRN: [id number]           Patient Information     Date Of Birth          2/22/1926        Visit Information        Provider Department      6/1/2018 10:00 AM RG RN [facility] [place]        Today's Diagnoses     Neurogenic bladder           Follow-ups after your visit        Your next 10 appointments already scheduled     Jun 01, 2018 10:00 AM CDT   Nurse Only with RG RN   [facility] [place] ([facility])    [address]  [place] [address] [phone]            Jul 02, 2018  9:00 AM CDT   Nurse Only with RG RN   [facility] [place] ([facility])    [address]  [place] [address] [phone]              Who to contact     If you have questions or need follow up information about today's clinic visit or your schedule please contact [facility] [place] directly at [phone].  Normal or non-critical lab and imaging results will be communicated to you by [employer]art, letter or phone within 4 business days after the clinic has received the results. If you do not hear from us within 7 days, please contact the clinic through [employer] or phone. If you have a critical or abnormal lab result, we will notify you by phone as soon as possible.  Submit refill requests through [employer] or call your pharmacy and they will forward the refill request to us. Please allow 3 business days for your refill to be completed.          Additional Information About Your Visit        [employer] Information     [employer] gives you secure access to your electronic health record. If you see a primary care provider, you can also send messages to your care team and make appointments. If you have questions, please call your primary care clinic.  If you do not have a primary care provider, please call [phone] and they will assist you.        Care EveryWhere ID     This is your Care  EveryWhere ID. This could be used by other organizations to access your [place] medical records  [id number]         Blood Pressure from Last 3 Encounters:   04/24/18 136/74   08/30/17 150/70   07/18/17 118/60    Weight from Last 3 Encounters:   04/24/18 108 lb 11.2 oz (49.3 kg)   08/30/17 115 lb (52.2 kg)   07/18/17 113 lb 11.2 oz (51.6 kg)              We Performed the Following     INSERT BLADDER CATH, TEMP INDWELL SIMPLE (BELTRE)        Primary Care Provider Office Phone # Fax #    Jessy Adams [phone] [address] [address]        Equal Access to Services     JENNY RASMUSSEN : Hadii aad ku hadasho Soomaali, waaxda luqadaha, qaybta kaalmada adeegyada, waxay idiin hayleon iron cam . So [facility] [phone].    ATENCIÓN: Si habla español, tiene a johns disposición servicios gratuitos de asistencia lingüística. LlOhio[facility] [phone].    We comply with applicable federal civil rights laws and [place] laws. We do not discriminate on the basis of race, color, national origin, age, disability, sex, sexual orientation, or gender identity.            Thank you!     Thank you for choosing [facility]  for your care. Our goal is always to provide you with excellent care. Hearing back from our patients is one way we can continue to improve our services. Please take a few minutes to complete the written survey that you may receive in the mail after your visit with us. Thank you!             Your Updated Medication List - Protect others around you: Learn how to safely use, store and throw away your medicines at www.disposemymeds.org.          This list is accurate as of 6/1/18  9:32 AM.  Always use your most recent med list.                   Brand Name Dispense Instructions for use Diagnosis    acetaminophen 325 MG tablet    TYLENOL          aspirin 81 MG tablet      Take 81 mg by mouth daily        blood glucose monitoring lancets     100 Box    1 each by In  Vitro route 3 times daily Use to test blood sugar 3 times daily or as directed.    Type 2 diabetes mellitus with hyperglycemia (H)       blood glucose monitoring meter device kit     1 kit    USE TO CHECK GLUCOSE THREE TIMES DAILY OR AS DIRECTED    Diabetes mellitus, type 2 (H)       blood glucose monitoring test strip    ONETOUCH ULTRA    100 each    TEST 3 TIMES DAILY OR AS DIRECTED    Type 2 diabetes mellitus with hyperglycemia (H)       ciprofloxacin 250 MG tablet    CIPRO          COQ-10 PO           CRANBERRY CONCENTRATE 500 MG Caps   Generic drug:  Cranberry     60 capsule    TAKE 1 CAPSULE BY MOUTH DAILY    Health care maintenance       GLUCOSAMINE CHONDR COMPLEX PO      Reported on 4/19/2017        Lactobacillus Acidophilus Powd      Take 1 Packet by mouth twice daily with food.        metFORMIN 500 MG 24 hr tablet    GLUCOPHAGE-XR    360 tablet    TAKE 2 TABLETS BY MOUTH 2 TIMES DAILY WITH MEALS    Type 2 diabetes mellitus without complication, without long-term current use of insulin (H)       nitroFURantoin (macrocrystal-monohydrate) 100 MG capsule    MACROBID    14 capsule    Take 1 capsule (100 mg) by mouth 2 times daily    Acute cystitis without hematuria       OCUVITE PRESERVISION Tabs           * order for DME     1 Units    Equipment being ordered: Wheelchair    DDD (degenerative disc disease), lumbar       * order for DME     2 each    Equipment being ordered: catheter secure strap, please fill the elastic strap with the green velcroe closure.    Urinary retention with incomplete bladder emptying       order for DME     3 each    Equipment being ordered: supplies for catheterization. Leg bag replacements and attachments. Dispense per insurance allowance.    Neurogenic bladder       QUEtiapine 25 MG tablet    SEROquel     Take 25 mg by mouth once daily at bedtime. May take additional 25 mg three times daily as needed for agitation, hallucinations        ramipril 10 MG capsule    ALTACE    90  capsule    TAKE 1 CAPSULE BY MOUTH DAILY    Essential hypertension with goal blood pressure less than 140/90       T.E.D. BELOW KNEE/M-REGULAR Misc     1 Package    Wear stockings daily    Left leg swelling       * Notice:  This list has 2 medication(s) that are the same as other medications prescribed for you. Read the directions carefully, and ask your doctor or other care provider to review them with you.

## 2018-06-01 NOTE — Clinical Note
Patient tolerated well. Having increase of BMs today, BM accident prior to arriving to clinic. Discussed creams for soreness that could be used. Brandi Jimenez RN, BSN

## 2018-06-20 NOTE — PROGRESS NOTES
Catheter insertion documentation on 6/20/2018:    María Hill presents to the clinic for catheter insertion.  Reason for insertion: Pt's previous catheter was accidentally pulled out this morning  Order has been verified. Yes, VO from DA ok to replace today  Catheter successfully inserted into the urethral meatus in the usual sterile fashion without immediate complication.  Type of catheter placed: 16 Tamazight indwelling catheter  Urine is clear in color.  50 cc's of urine output returned.  Balloon was filled with 10cc's of normal saline.  Securement device placed for the catheter.  The patient tolerated the procedure and was instructed to follow up with their PCP or consultant as planned    Pt had a fall this weekend and was seen in ED. Her face is bruised. Daughter reports the pt is being treated for another UTI.  Scheduled F/U appt with     Next 5 appointments (look out 90 days)     Jul 03, 2018 10:20 AM CDT   Office Visit with Jessy Adams MD   [facility] [place] ([facility]    [address]  [place] [address] [phone]                Maile Valverde

## 2018-06-20 NOTE — MR AVS SNAPSHOT
After Visit Summary   6/20/2018    María Hill    MRN: [id number]           Patient Information     Date Of Birth          2/22/1926        Visit Information        Provider Department      6/20/2018 11:00 AM RG RN [facility]        Today's Diagnoses     Neurogenic bladder           Follow-ups after your visit        Your next 10 appointments already scheduled     Jul 03, 2018 10:20 AM CDT   Office Visit with Jessy Adams MD   [facility] ([facility])    [id number] [place] [address], [address]  [place] [address] [phone]           Bring a current list of meds and any records pertaining to this visit. For Physicals, please bring immunization records and any forms needing to be filled out. Please arrive 10 minutes early to complete paperwork.              Who to contact     If you have questions or need follow up information about today's clinic visit or your schedule please contact [facility] directly at [phone].  Normal or non-critical lab and imaging results will be communicated to you by [employer], letter or phone within 4 business days after the clinic has received the results. If you do not hear from us within 7 days, please contact the clinic through [employer] or phone. If you have a critical or abnormal lab result, we will notify you by phone as soon as possible.  Submit refill requests through Gem[employer] or call your pharmacy and they will forward the refill request to us. Please allow 3 business days for your refill to be completed.          Additional Information About Your Visit        MyChart Information     [employer] gives you secure access to your electronic health record. If you see a primary care provider, you can also send messages to your care team and make appointments. If you have questions, please call your primary care clinic.  If you do not have a primary care provider, please call [phone] and they will assist  you.        Care EveryWhere ID     This is your Care EveryWhere ID. This could be used by other organizations to access your [place] medical records  [id number]         Blood Pressure from Last 3 Encounters:   04/24/18 136/74   08/30/17 150/70   07/18/17 118/60    Weight from Last 3 Encounters:   04/24/18 108 lb 11.2 oz (49.3 kg)   08/30/17 115 lb (52.2 kg)   07/18/17 113 lb 11.2 oz (51.6 kg)              We Performed the Following     INSERT BLADDER CATH, TEMP INDWELL SIMPLE (BELTRE)        Primary Care Provider Office Phone # Fax #    Jessy Adams [phone] [address] [facility] [address]        Equal Access to Services     JENNY RASMUSSEN : Hadii aad ku hadasho Soomaali, waaxda luqadaha, qaybta kaalmada adeegyada, waxay pierre hayleon iron cam . So [facility] [phone].    ATENCIÓN: Si habla español, tiene a johns disposición servicios gratuitos de asistencia lingüística. Llame al [phone].    We comply with applicable federal civil rights laws and [place] laws. We do not discriminate on the basis of race, color, national origin, age, disability, sex, sexual orientation, or gender identity.            Thank you!     Thank you for choosing [facility]  for your care. Our goal is always to provide you with excellent care. Hearing back from our patients is one way we can continue to improve our services. Please take a few minutes to complete the written survey that you may receive in the mail after your visit with us. Thank you!             Your Updated Medication List - Protect others around you: Learn how to safely use, store and throw away your medicines at www.disposemymeds.org.          This list is accurate as of 6/20/18 11:30 AM.  Always use your most recent med list.                   Brand Name Dispense Instructions for use Diagnosis    acetaminophen 325 MG tablet    TYLENOL          aspirin 81 MG tablet      Take 81 mg by mouth daily        blood  glucose monitoring lancets     100 Box    1 each by In Vitro route 3 times daily Use to test blood sugar 3 times daily or as directed.    Type 2 diabetes mellitus with hyperglycemia (H)       blood glucose monitoring meter device kit     1 kit    USE TO CHECK GLUCOSE THREE TIMES DAILY OR AS DIRECTED    Diabetes mellitus, type 2 (H)       blood glucose monitoring test strip    ONETOUCH ULTRA    100 each    TEST 3 TIMES DAILY OR AS DIRECTED    Type 2 diabetes mellitus with hyperglycemia (H)       ciprofloxacin 250 MG tablet    CIPRO          COQ-10 PO           CRANBERRY CONCENTRATE 500 MG Caps   Generic drug:  Cranberry     60 capsule    TAKE 1 CAPSULE BY MOUTH DAILY    Health care maintenance       GLUCOSAMINE CHONDR COMPLEX PO      Reported on 4/19/2017        Lactobacillus Acidophilus Powd      Take 1 Packet by mouth twice daily with food.        metFORMIN 500 MG 24 hr tablet    GLUCOPHAGE-XR    360 tablet    TAKE 2 TABLETS BY MOUTH 2 TIMES DAILY WITH MEALS    Type 2 diabetes mellitus without complication, without long-term current use of insulin (H)       nitroFURantoin (macrocrystal-monohydrate) 100 MG capsule    MACROBID    14 capsule    Take 1 capsule (100 mg) by mouth 2 times daily    Acute cystitis without hematuria       OCUVITE PRESERVISION Tabs           * order for DME     1 Units    Equipment being ordered: Wheelchair    DDD (degenerative disc disease), lumbar       * order for DME     2 each    Equipment being ordered: catheter secure strap, please fill the elastic strap with the green velcroe closure.    Urinary retention with incomplete bladder emptying       order for DME     3 each    Equipment being ordered: supplies for catheterization. Leg bag replacements and attachments. Dispense per insurance allowance.    Neurogenic bladder       QUEtiapine 25 MG tablet    SEROquel     Take 25 mg by mouth once daily at bedtime. May take additional 25 mg three times daily as needed for agitation,  hallucinations        ramipril 10 MG capsule    ALTACE    90 capsule    TAKE 1 CAPSULE BY MOUTH DAILY    Essential hypertension with goal blood pressure less than 140/90       T.E.D. BELOW KNEE/M-REGULAR Misc     1 Package    Wear stockings daily    Left leg swelling       * Notice:  This list has 2 medication(s) that are the same as other medications prescribed for you. Read the directions carefully, and ask your doctor or other care provider to review them with you.

## 2018-06-28 NOTE — PROGRESS NOTES
SUBJECTIVE:   María Hill is a 89 year old female who presents to clinic today for the following health issues:    Need order for wheel chair.    History of Present Illness     CVA:     Patient history::  Unknown    Residual symptoms::  Difficulty walking and Left leg weakness    Headache location::  Not applicable    Location of tingling::  Not applicable    Worsened or new symptoms since last visit::  No    Use of ASA daily?:  NO    Diabetes:     Frequency of checking blood sugars::  Rarely    Diabetic concerns::  None    Hypoglycemia symptoms::  None    Paraesthesia present::  No    Eye Exam in the last year::  Yes    2016    [facility]    Hypertension:     Outpatient blood pressures:  Are not being checked    Dietary sodium intake::  Low salt diet    Diet:  Low salt and Low fat/cholesterol  Frequency of exercise:  2-3 days/week  Duration of exercise:  Less than 15 minutes  Taking medications regularly:  Yes  Medication side effects:  None  Additional concerns today:  YES    ED/UC Followup:    Facility:  [facility])   Date of visit: 6/9/18  Reason for visit: UTI and possible mild stroke   Current Status: patient is having trouble walking, recently fell two weeks ago      She was in the hospital, but she notes that she didn't even remember what she was in for. Her daughter notes that she was speaking words that didn't make any sense. The doctors in the ER suspected that she had a small stroke, and also she had a bladder infection, but didn't want to do any operations as they didn't think it would benefit her, nor would she have a good chance of surviving. She also notes that she needs an order for a new wheel chair, as the one she got previously was too wobbly and she didn't feel safe or comfortable in it. When home health care discharged her most recently, they had recommended that she have a new wheelchair.    She needs to schedule a catheter change.  Her daughter reports that while she was in the hospital she pulled her catheter out on her own, which was abnormal as the last time she had a catheter the removal was very painful.     Problem list and histories reviewed & adjusted, as indicated.  Additional history: as documented    BP Readings from Last 3 Encounters:   07/03/18 124/86   04/24/18 136/74   08/30/17 150/70    Wt Readings from Last 3 Encounters:   04/24/18 49.3 kg (108 lb 11.2 oz)   08/30/17 52.2 kg (115 lb)   07/18/17 51.6 kg (113 lb 11.2 oz)         ROS:  CONSTITUTIONAL: NEGATIVE for fever, chills, change in weight  ENT/MOUTH: NEGATIVE for ear, mouth and throat problems  RESP: NEGATIVE for significant cough or SOB  CV: NEGATIVE for chest pain, palpitations or peripheral edema  : urinary tract infection  NEURO: POSITIVE for memory problems and weakness     This document serves as a record of the services and decisions personally performed and made by Jessy Adams MD. It was created on her behalf by Dorothy Pa, a trained medical scribe. The creation of this document is based the provider's statements to the medical scribe.  Dorothy Pa, July 3, 2018 11:01 AM     OBJECTIVE:     /86  Pulse 63  Temp 97.3  F (36.3  C) (Temporal)  SpO2 98%  There is no height or weight on file to calculate BMI.  GENERAL: healthy, alert and no distress  EYES: Eyes grossly normal to inspection, PERRL and conjunctivae and sclerae normal  RESP: lungs clear to auscultation - no rales, rhonchi or wheezes  CV: regular rate and rhythm, normal S1 S2, no S3 or S4, no murmur, click or rub, no peripheral edema and peripheral pulses strong  MS: no gross musculoskeletal defects noted, no edema, lump on right forehead  SKIN: bruising under both eyes from a fall she had 3 weeks ago where she hit the side of a lamp shade  NEURO: mentation intact and speech normal  PSYCH: mentation appears normal, affect normal/bright    Diagnostic Test Results:  No results found for this or  any previous visit (from the past 24 hour(s)).    ASSESSMENT/PLAN:         1. Recurrent UTI  Her UTI seems to be clearing up well after antibiotic treatment.   No repeat needed.   Recheck if return of symptoms.     2. Muscle weakness (generalized)  General weakness. Has exercises to do but has not been doing them.  Recommend returning to these.   May consider returning to PT.   Discussed use of belt for transfers.   Will check with care coordination for help with wheelchair    3. Type 2 diabetes mellitus without complication, without long-term current use of insulin (H)  No changes today.     4. Essential hypertension  Doing well. Well controlled. Tolerating medication.  No change in plan.      All questions invited, asked and answered to the patient's apparent satisfaction.  Patient agrees to plan.     See Patient Instructions    The information in this document, created by the medical scribe for me, accurately reflects the services I personally performed and the decisions made by me. I have reviewed and approved this document for accuracy.     MAURICE WOODS MD, MD  [facility]

## 2018-07-03 NOTE — PATIENT INSTRUCTIONS
Recheck in 3 months    Do exercises from physical therapy consistently.     Will work with care coordination regarding wheelchair

## 2018-07-03 NOTE — MR AVS SNAPSHOT
After Visit Summary   7/3/2018    María Hill    MRN: [id number]           Patient Information     Date Of Birth          2/22/1926        Visit Information        Provider Department      7/3/2018 10:20 AM Jessy Adams MD [facility]        Care Instructions      Recheck in 3 months    Do exercises from physical therapy consistently.     Will work with care coordination regarding wheelchair              Follow-ups after your visit        Follow-up notes from your care team     Return in about 3 months (around 10/3/2018) for Recheck.      Who to contact     If you have questions or need follow up information about today's clinic visit or your schedule please contact [facility] directly at [phone].  Normal or non-critical lab and imaging results will be communicated to you by [employer], letter or phone within 4 business days after the clinic has received the results. If you do not hear from us within 7 days, please contact the clinic through [employer] or phone. If you have a critical or abnormal lab result, we will notify you by phone as soon as possible.  Submit refill requests through [employer] or call your pharmacy and they will forward the refill request to us. Please allow 3 business days for your refill to be completed.          Additional Information About Your Visit        MyChart Information     [employer] gives you secure access to your electronic health record. If you see a primary care provider, you can also send messages to your care team and make appointments. If you have questions, please call your primary care clinic.  If you do not have a primary care provider, please call [phone] and they will assist you.        Care EveryWhere ID     This is your Care EveryWhere ID. This could be used by other organizations to access your [place] medical records  [id number]        Your Vitals Were     Pulse Temperature Pulse Oximetry             63 97.3  F  (36.3  C) (Temporal) 98%          Blood Pressure from Last 3 Encounters:   07/03/18 124/86   04/24/18 136/74   08/30/17 150/70    Weight from Last 3 Encounters:   04/24/18 108 lb 11.2 oz (49.3 kg)   08/30/17 115 lb (52.2 kg)   07/18/17 113 lb 11.2 oz (51.6 kg)              Today, you had the following     No orders found for display       Primary Care Provider Office Phone # Fax #    Jessy Adams [phone] [address] [facility] [address]        Equal Access to Services     [facility] : Hadii aad aarti hadasho Sostephanieali, waaxda luqadaha, qaybta kaalmada adeegyada, islvina cam . So [facility] [phone].    ATENCIÓN: Si habla español, tiene a johns disposición servicios gratuitos de asistencia lingüística. Llame al [phone].    We comply with applicable federal civil rights laws and [place] laws. We do not discriminate on the basis of race, color, national origin, age, disability, sex, sexual orientation, or gender identity.            Thank you!     Thank you for choosing [facility]  for your care. Our goal is always to provide you with excellent care. Hearing back from our patients is one way we can continue to improve our services. Please take a few minutes to complete the written survey that you may receive in the mail after your visit with us. Thank you!             Your Updated Medication List - Protect others around you: Learn how to safely use, store and throw away your medicines at www.disposemymeds.org.          This list is accurate as of 7/3/18 11:19 AM.  Always use your most recent med list.                   Brand Name Dispense Instructions for use Diagnosis    acetaminophen 325 MG tablet    TYLENOL          aspirin 81 MG tablet      Take 81 mg by mouth daily        blood glucose monitoring lancets     100 Box    1 each by In Vitro route 3 times daily Use to test blood sugar 3 times daily or as directed.    Type 2 diabetes  mellitus with hyperglycemia (H)       blood glucose monitoring meter device kit     1 kit    USE TO CHECK GLUCOSE THREE TIMES DAILY OR AS DIRECTED    Diabetes mellitus, type 2 (H)       blood glucose monitoring test strip    ONETOUCH ULTRA    100 each    TEST 3 TIMES DAILY OR AS DIRECTED    Type 2 diabetes mellitus with hyperglycemia (H)       ciprofloxacin 250 MG tablet    CIPRO          COQ-10 PO           CRANBERRY CONCENTRATE 500 MG Caps   Generic drug:  Cranberry     60 capsule    TAKE 1 CAPSULE BY MOUTH DAILY    Health care maintenance       GLUCOSAMINE CHONDR COMPLEX PO      Reported on 4/19/2017        Lactobacillus Acidophilus Powd      Take 1 Packet by mouth twice daily with food.        metFORMIN 500 MG 24 hr tablet    GLUCOPHAGE-XR    360 tablet    TAKE 2 TABLETS BY MOUTH 2 TIMES DAILY WITH MEALS    Type 2 diabetes mellitus without complication, without long-term current use of insulin (H)       OCUVITE PRESERVISION Tabs           * order for DME     1 Units    Equipment being ordered: Wheelchair    DDD (degenerative disc disease), lumbar       * order for DME     2 each    Equipment being ordered: catheter secure strap, please fill the elastic strap with the green velcroe closure.    Urinary retention with incomplete bladder emptying       order for DME     3 each    Equipment being ordered: supplies for catheterization. Leg bag replacements and attachments. Dispense per insurance allowance.    Neurogenic bladder       QUEtiapine 25 MG tablet    SEROquel     Take 25 mg by mouth once daily at bedtime. May take additional 25 mg three times daily as needed for agitation, hallucinations        ramipril 10 MG capsule    ALTACE    90 capsule    TAKE 1 CAPSULE BY MOUTH DAILY    Essential hypertension with goal blood pressure less than 140/90       T.E.D. BELOW KNEE/M-REGULAR Misc     1 Package    Wear stockings daily    Left leg swelling       * Notice:  This list has 2 medication(s) that are the same as other  medications prescribed for you. Read the directions carefully, and ask your doctor or other care provider to review them with you.

## 2018-07-10 NOTE — TELEPHONE ENCOUNTER
"Requested Prescriptions   Pending Prescriptions Disp Refills     metFORMIN (GLUCOPHAGE-XR) 500 MG 24 hr tablet [Pharmacy Med Name: METFORMIN  MG TABLET] 360 tablet 0     Sig: TAKE 2 TABLETS BY MOUTH 2 TIMES DAILY WITH MEALS    Biguanide Agents Passed    7/9/2018  3:50 PM       Passed - Blood pressure less than 140/90 in past 6 months    BP Readings from Last 3 Encounters:   07/03/18 124/86   04/24/18 136/74   08/30/17 150/70          Passed - Patient has documented LDL within the past 12 mos.    Recent Labs   Lab Test  04/30/18   1205   LDL  86          Passed - Patient has had a Microalbumin in the past 12 mos.    Recent Labs   Lab Test  07/18/17   1446   MICROL  88   UMALCR  90.39*          Passed - Patient is age 10 or older       Passed - Patient has documented A1c within the specified period of time.    If HgbA1C is 8 or greater, it needs to be on file within the past 3 months.  If less than 8, must be on file within the past 6 months.     Recent Labs   Lab Test  04/30/18   1205   A1C  6.6*          Passed - Patient's CR is NOT>1.4 OR Patient's EGFR is NOT<45 within past 12 mos.    Recent Labs   Lab Test 06/09/18   GFRESTIMATED  53*   GFRESTBLACK  >60     Recent Labs   Lab Test 06/09/18   CR  0.98          Passed - Patient does NOT have a diagnosis of CHF.       Passed - Patient is not pregnant       Passed - Patient has not had a positive pregnancy test within the past 12 mos.        Passed - Recent (6 mo) or future (30 days) visit within the authorizing provider's specialty    Patient had office visit in the last 6 months or has a visit in the next 30 days with authorizing provider or within the authorizing provider's specialty.  See \"Patient Info\" tab in inbasket, or \"Choose Columns\" in Meds & Orders section of the refill encounter.            metFORMIN (GLUCOPHAGE-XR) 500 MG 24 hr tablet  Medication is being filled for 1 time refill only due to:  Future labs ordered due for microalbumin. due for next " OV around 10/2018  Please assist with scheduling lab appointment.    Brandi Jimenez RN, BSN